# Patient Record
Sex: FEMALE | Race: WHITE | NOT HISPANIC OR LATINO | Employment: UNEMPLOYED | URBAN - NONMETROPOLITAN AREA
[De-identification: names, ages, dates, MRNs, and addresses within clinical notes are randomized per-mention and may not be internally consistent; named-entity substitution may affect disease eponyms.]

---

## 2021-11-08 ENCOUNTER — HOSPITAL ENCOUNTER (OUTPATIENT)
Facility: HOSPITAL | Age: 26
Discharge: HOME | End: 2021-11-12
Attending: EMERGENCY MEDICINE | Admitting: SURGERY
Payer: MEDICAID

## 2021-11-08 DIAGNOSIS — N17.9 ACUTE RENAL FAILURE, UNSPECIFIED ACUTE RENAL FAILURE TYPE: ICD-10-CM

## 2021-11-08 DIAGNOSIS — K61.1 PERIRECTAL ABSCESS: Primary | ICD-10-CM

## 2021-11-08 LAB
ALBUMIN SERPL BCP-MCNC: 3.6 G/DL (ref 3.5–5.2)
ALP SERPL-CCNC: 65 U/L (ref 55–135)
ALT SERPL W/O P-5'-P-CCNC: 22 U/L (ref 10–44)
ANION GAP SERPL CALC-SCNC: 7 MMOL/L (ref 8–16)
AST SERPL-CCNC: 11 U/L (ref 10–40)
BASOPHILS # BLD AUTO: 0.03 K/UL (ref 0–0.2)
BASOPHILS NFR BLD: 0.3 % (ref 0–1.9)
BILIRUB SERPL-MCNC: 0.4 MG/DL (ref 0.1–1)
BUN SERPL-MCNC: 8 MG/DL (ref 6–20)
CALCIUM SERPL-MCNC: 9.1 MG/DL (ref 8.7–10.5)
CHLORIDE SERPL-SCNC: 104 MMOL/L (ref 95–110)
CO2 SERPL-SCNC: 27 MMOL/L (ref 23–29)
CREAT SERPL-MCNC: 0.6 MG/DL (ref 0.5–1.4)
CTP QC/QA: YES
DIFFERENTIAL METHOD: ABNORMAL
EOSINOPHIL # BLD AUTO: 0 K/UL (ref 0–0.5)
EOSINOPHIL NFR BLD: 0.1 % (ref 0–8)
ERYTHROCYTE [DISTWIDTH] IN BLOOD BY AUTOMATED COUNT: 13.7 % (ref 11.5–14.5)
EST. GFR  (AFRICAN AMERICAN): >60 ML/MIN/1.73 M^2
EST. GFR  (NON AFRICAN AMERICAN): >60 ML/MIN/1.73 M^2
GLUCOSE SERPL-MCNC: 93 MG/DL (ref 70–110)
HCT VFR BLD AUTO: 39.7 % (ref 37–48.5)
HGB BLD-MCNC: 12.8 G/DL (ref 12–16)
IMM GRANULOCYTES # BLD AUTO: 0.05 K/UL (ref 0–0.04)
IMM GRANULOCYTES NFR BLD AUTO: 0.4 % (ref 0–0.5)
LYMPHOCYTES # BLD AUTO: 1.9 K/UL (ref 1–4.8)
LYMPHOCYTES NFR BLD: 15.9 % (ref 18–48)
MCH RBC QN AUTO: 27.2 PG (ref 27–31)
MCHC RBC AUTO-ENTMCNC: 32.2 G/DL (ref 32–36)
MCV RBC AUTO: 85 FL (ref 82–98)
MONOCYTES # BLD AUTO: 0.8 K/UL (ref 0.3–1)
MONOCYTES NFR BLD: 6.3 % (ref 4–15)
NEUTROPHILS # BLD AUTO: 9.2 K/UL (ref 1.8–7.7)
NEUTROPHILS NFR BLD: 77 % (ref 38–73)
NRBC BLD-RTO: 0 /100 WBC
PLATELET # BLD AUTO: 354 K/UL (ref 150–450)
PMV BLD AUTO: 9.7 FL (ref 9.2–12.9)
POTASSIUM SERPL-SCNC: 3.7 MMOL/L (ref 3.5–5.1)
PROT SERPL-MCNC: 8 G/DL (ref 6–8.4)
RBC # BLD AUTO: 4.7 M/UL (ref 4–5.4)
SARS-COV-2 RDRP RESP QL NAA+PROBE: NEGATIVE
SODIUM SERPL-SCNC: 138 MMOL/L (ref 136–145)
WBC # BLD AUTO: 11.94 K/UL (ref 3.9–12.7)

## 2021-11-08 PROCEDURE — 63600175 PHARM REV CODE 636 W HCPCS: Performed by: EMERGENCY MEDICINE

## 2021-11-08 PROCEDURE — G0378 HOSPITAL OBSERVATION PER HR: HCPCS | Mod: OBSCO

## 2021-11-08 PROCEDURE — 25000003 PHARM REV CODE 250: Performed by: EMERGENCY MEDICINE

## 2021-11-08 PROCEDURE — 80053 COMPREHEN METABOLIC PANEL: CPT | Performed by: EMERGENCY MEDICINE

## 2021-11-08 PROCEDURE — 85025 COMPLETE CBC W/AUTO DIFF WBC: CPT | Performed by: EMERGENCY MEDICINE

## 2021-11-08 PROCEDURE — 96374 THER/PROPH/DIAG INJ IV PUSH: CPT | Mod: 59

## 2021-11-08 PROCEDURE — 25000003 PHARM REV CODE 250: Performed by: SURGERY

## 2021-11-08 PROCEDURE — 96376 TX/PRO/DX INJ SAME DRUG ADON: CPT | Mod: 59

## 2021-11-08 PROCEDURE — 36415 COLL VENOUS BLD VENIPUNCTURE: CPT | Performed by: EMERGENCY MEDICINE

## 2021-11-08 PROCEDURE — 96375 TX/PRO/DX INJ NEW DRUG ADDON: CPT

## 2021-11-08 PROCEDURE — 96361 HYDRATE IV INFUSION ADD-ON: CPT

## 2021-11-08 PROCEDURE — 99900035 HC TECH TIME PER 15 MIN (STAT)

## 2021-11-08 PROCEDURE — 27000221 HC OXYGEN, UP TO 24 HOURS

## 2021-11-08 PROCEDURE — G0378 HOSPITAL OBSERVATION PER HR: HCPCS

## 2021-11-08 PROCEDURE — 99285 EMERGENCY DEPT VISIT HI MDM: CPT | Mod: 25

## 2021-11-08 PROCEDURE — 25500020 PHARM REV CODE 255: Performed by: EMERGENCY MEDICINE

## 2021-11-08 PROCEDURE — 63600175 PHARM REV CODE 636 W HCPCS: Performed by: SURGERY

## 2021-11-08 PROCEDURE — U0002 COVID-19 LAB TEST NON-CDC: HCPCS | Performed by: EMERGENCY MEDICINE

## 2021-11-08 PROCEDURE — 99900031 HC PATIENT EDUCATION (STAT)

## 2021-11-08 RX ORDER — SODIUM CHLORIDE 0.9 % (FLUSH) 0.9 %
10 SYRINGE (ML) INJECTION
Status: DISCONTINUED | OUTPATIENT
Start: 2021-11-08 | End: 2021-11-12 | Stop reason: HOSPADM

## 2021-11-08 RX ORDER — SODIUM CHLORIDE 9 MG/ML
INJECTION, SOLUTION INTRAVENOUS CONTINUOUS
Status: DISCONTINUED | OUTPATIENT
Start: 2021-11-08 | End: 2021-11-12 | Stop reason: HOSPADM

## 2021-11-08 RX ORDER — HYDROMORPHONE HYDROCHLORIDE 1 MG/ML
0.5 INJECTION, SOLUTION INTRAMUSCULAR; INTRAVENOUS; SUBCUTANEOUS EVERY 4 HOURS PRN
Status: DISCONTINUED | OUTPATIENT
Start: 2021-11-08 | End: 2021-11-09

## 2021-11-08 RX ORDER — MORPHINE SULFATE 4 MG/ML
4 INJECTION, SOLUTION INTRAMUSCULAR; INTRAVENOUS
Status: COMPLETED | OUTPATIENT
Start: 2021-11-08 | End: 2021-11-08

## 2021-11-08 RX ORDER — TALC
6 POWDER (GRAM) TOPICAL NIGHTLY PRN
Status: DISCONTINUED | OUTPATIENT
Start: 2021-11-08 | End: 2021-11-12 | Stop reason: HOSPADM

## 2021-11-08 RX ADMIN — VANCOMYCIN HYDROCHLORIDE 2500 MG: 1 INJECTION, POWDER, LYOPHILIZED, FOR SOLUTION INTRAVENOUS at 08:11

## 2021-11-08 RX ADMIN — SODIUM CHLORIDE: 0.9 INJECTION, SOLUTION INTRAVENOUS at 05:11

## 2021-11-08 RX ADMIN — IOHEXOL 100 ML: 350 INJECTION, SOLUTION INTRAVENOUS at 03:11

## 2021-11-08 RX ADMIN — HYDROMORPHONE HYDROCHLORIDE 0.5 MG: 1 INJECTION, SOLUTION INTRAMUSCULAR; INTRAVENOUS; SUBCUTANEOUS at 06:11

## 2021-11-08 RX ADMIN — PIPERACILLIN AND TAZOBACTAM 4.5 G: 4; .5 INJECTION, POWDER, LYOPHILIZED, FOR SOLUTION INTRAVENOUS; PARENTERAL at 05:11

## 2021-11-08 RX ADMIN — MORPHINE SULFATE 4 MG: 4 INJECTION INTRAVENOUS at 03:11

## 2021-11-09 ENCOUNTER — ANESTHESIA EVENT (OUTPATIENT)
Dept: SURGERY | Facility: HOSPITAL | Age: 26
End: 2021-11-09
Payer: MEDICAID

## 2021-11-09 ENCOUNTER — ANESTHESIA (OUTPATIENT)
Dept: SURGERY | Facility: HOSPITAL | Age: 26
End: 2021-11-09
Payer: MEDICAID

## 2021-11-09 PROBLEM — K61.1 PERIRECTAL ABSCESS: Status: ACTIVE | Noted: 2021-11-09

## 2021-11-09 PROCEDURE — 87076 CULTURE ANAEROBE IDENT EACH: CPT | Performed by: SURGERY

## 2021-11-09 PROCEDURE — 99900035 HC TECH TIME PER 15 MIN (STAT)

## 2021-11-09 PROCEDURE — 94799 UNLISTED PULMONARY SVC/PX: CPT

## 2021-11-09 PROCEDURE — 37000008 HC ANESTHESIA 1ST 15 MINUTES: Performed by: SURGERY

## 2021-11-09 PROCEDURE — 00902 ANES ANORECTAL PX: CPT | Performed by: SURGERY

## 2021-11-09 PROCEDURE — 87070 CULTURE OTHR SPECIMN AEROBIC: CPT | Performed by: SURGERY

## 2021-11-09 PROCEDURE — 96366 THER/PROPH/DIAG IV INF ADDON: CPT

## 2021-11-09 PROCEDURE — 25000003 PHARM REV CODE 250: Performed by: SURGERY

## 2021-11-09 PROCEDURE — 37000009 HC ANESTHESIA EA ADD 15 MINS: Performed by: SURGERY

## 2021-11-09 PROCEDURE — 96365 THER/PROPH/DIAG IV INF INIT: CPT

## 2021-11-09 PROCEDURE — 63600175 PHARM REV CODE 636 W HCPCS: Performed by: SURGERY

## 2021-11-09 PROCEDURE — 96367 TX/PROPH/DG ADDL SEQ IV INF: CPT

## 2021-11-09 PROCEDURE — G0378 HOSPITAL OBSERVATION PER HR: HCPCS

## 2021-11-09 PROCEDURE — 96361 HYDRATE IV INFUSION ADD-ON: CPT | Mod: 59

## 2021-11-09 PROCEDURE — 36000705 HC OR TIME LEV I EA ADD 15 MIN: Performed by: SURGERY

## 2021-11-09 PROCEDURE — 99900031 HC PATIENT EDUCATION (STAT)

## 2021-11-09 PROCEDURE — 71000033 HC RECOVERY, INTIAL HOUR: Performed by: SURGERY

## 2021-11-09 PROCEDURE — 36000704 HC OR TIME LEV I 1ST 15 MIN: Performed by: SURGERY

## 2021-11-09 PROCEDURE — 96376 TX/PRO/DX INJ SAME DRUG ADON: CPT | Mod: 59

## 2021-11-09 PROCEDURE — 94761 N-INVAS EAR/PLS OXIMETRY MLT: CPT

## 2021-11-09 PROCEDURE — 87075 CULTR BACTERIA EXCEPT BLOOD: CPT | Performed by: SURGERY

## 2021-11-09 PROCEDURE — 63600175 PHARM REV CODE 636 W HCPCS: Performed by: REGISTERED NURSE

## 2021-11-09 RX ORDER — DEXAMETHASONE SODIUM PHOSPHATE 4 MG/ML
INJECTION, SOLUTION INTRA-ARTICULAR; INTRALESIONAL; INTRAMUSCULAR; INTRAVENOUS; SOFT TISSUE
Status: DISCONTINUED | OUTPATIENT
Start: 2021-11-09 | End: 2021-11-09

## 2021-11-09 RX ORDER — ACETAMINOPHEN 325 MG/1
650 TABLET ORAL EVERY 6 HOURS PRN
Status: DISCONTINUED | OUTPATIENT
Start: 2021-11-09 | End: 2021-11-12 | Stop reason: HOSPADM

## 2021-11-09 RX ORDER — HYDROMORPHONE HYDROCHLORIDE 1 MG/ML
1 INJECTION, SOLUTION INTRAMUSCULAR; INTRAVENOUS; SUBCUTANEOUS EVERY 4 HOURS PRN
Status: DISCONTINUED | OUTPATIENT
Start: 2021-11-09 | End: 2021-11-12 | Stop reason: HOSPADM

## 2021-11-09 RX ORDER — DIPHENHYDRAMINE HYDROCHLORIDE 50 MG/ML
25 INJECTION INTRAMUSCULAR; INTRAVENOUS EVERY 6 HOURS PRN
Status: DISCONTINUED | OUTPATIENT
Start: 2021-11-09 | End: 2021-11-09

## 2021-11-09 RX ORDER — ONDANSETRON 2 MG/ML
INJECTION INTRAMUSCULAR; INTRAVENOUS
Status: DISCONTINUED | OUTPATIENT
Start: 2021-11-09 | End: 2021-11-09

## 2021-11-09 RX ORDER — PROPOFOL 10 MG/ML
INJECTION, EMULSION INTRAVENOUS
Status: DISCONTINUED | OUTPATIENT
Start: 2021-11-09 | End: 2021-11-09

## 2021-11-09 RX ORDER — LIDOCAINE 50 MG/G
OINTMENT TOPICAL
Status: DISCONTINUED | OUTPATIENT
Start: 2021-11-09 | End: 2021-11-09 | Stop reason: HOSPADM

## 2021-11-09 RX ORDER — BUPIVACAINE HYDROCHLORIDE AND EPINEPHRINE 5; 5 MG/ML; UG/ML
INJECTION, SOLUTION EPIDURAL; INTRACAUDAL; PERINEURAL
Status: DISCONTINUED | OUTPATIENT
Start: 2021-11-09 | End: 2021-11-09 | Stop reason: HOSPADM

## 2021-11-09 RX ORDER — HYDROMORPHONE HYDROCHLORIDE 1 MG/ML
0.5 INJECTION, SOLUTION INTRAMUSCULAR; INTRAVENOUS; SUBCUTANEOUS EVERY 5 MIN PRN
Status: DISCONTINUED | OUTPATIENT
Start: 2021-11-09 | End: 2021-11-09

## 2021-11-09 RX ORDER — TRAMADOL HYDROCHLORIDE 50 MG/1
50 TABLET ORAL EVERY 4 HOURS PRN
Status: DISCONTINUED | OUTPATIENT
Start: 2021-11-09 | End: 2021-11-12 | Stop reason: HOSPADM

## 2021-11-09 RX ORDER — ONDANSETRON 2 MG/ML
4 INJECTION INTRAMUSCULAR; INTRAVENOUS EVERY 6 HOURS PRN
Status: DISCONTINUED | OUTPATIENT
Start: 2021-11-09 | End: 2021-11-12 | Stop reason: HOSPADM

## 2021-11-09 RX ORDER — HYDROCODONE BITARTRATE AND ACETAMINOPHEN 5; 325 MG/1; MG/1
1 TABLET ORAL EVERY 4 HOURS PRN
Status: DISCONTINUED | OUTPATIENT
Start: 2021-11-09 | End: 2021-11-12 | Stop reason: HOSPADM

## 2021-11-09 RX ORDER — SODIUM CHLORIDE 9 MG/ML
INJECTION, SOLUTION INTRAVENOUS CONTINUOUS
Status: DISCONTINUED | OUTPATIENT
Start: 2021-11-09 | End: 2021-11-09

## 2021-11-09 RX ORDER — PROCHLORPERAZINE EDISYLATE 5 MG/ML
5 INJECTION INTRAMUSCULAR; INTRAVENOUS EVERY 6 HOURS PRN
Status: DISCONTINUED | OUTPATIENT
Start: 2021-11-09 | End: 2021-11-12 | Stop reason: HOSPADM

## 2021-11-09 RX ORDER — ONDANSETRON 2 MG/ML
4 INJECTION INTRAMUSCULAR; INTRAVENOUS DAILY PRN
Status: DISCONTINUED | OUTPATIENT
Start: 2021-11-09 | End: 2021-11-09

## 2021-11-09 RX ORDER — FENTANYL CITRATE 50 UG/ML
INJECTION, SOLUTION INTRAMUSCULAR; INTRAVENOUS
Status: DISCONTINUED | OUTPATIENT
Start: 2021-11-09 | End: 2021-11-09

## 2021-11-09 RX ORDER — MORPHINE SULFATE 4 MG/ML
4 INJECTION, SOLUTION INTRAMUSCULAR; INTRAVENOUS EVERY 5 MIN PRN
Status: DISCONTINUED | OUTPATIENT
Start: 2021-11-09 | End: 2021-11-09

## 2021-11-09 RX ORDER — KETOROLAC TROMETHAMINE 30 MG/ML
INJECTION, SOLUTION INTRAMUSCULAR; INTRAVENOUS
Status: DISCONTINUED | OUTPATIENT
Start: 2021-11-09 | End: 2021-11-09

## 2021-11-09 RX ADMIN — ONDANSETRON 4 MG: 2 INJECTION INTRAMUSCULAR; INTRAVENOUS at 08:11

## 2021-11-09 RX ADMIN — PIPERACILLIN AND TAZOBACTAM 4.5 G: 4; .5 INJECTION, POWDER, LYOPHILIZED, FOR SOLUTION INTRAVENOUS; PARENTERAL at 05:11

## 2021-11-09 RX ADMIN — VANCOMYCIN HYDROCHLORIDE 2000 MG: 1 INJECTION, POWDER, LYOPHILIZED, FOR SOLUTION INTRAVENOUS at 08:11

## 2021-11-09 RX ADMIN — PIPERACILLIN AND TAZOBACTAM 4.5 G: 4; .5 INJECTION, POWDER, LYOPHILIZED, FOR SOLUTION INTRAVENOUS; PARENTERAL at 10:11

## 2021-11-09 RX ADMIN — ACETAMINOPHEN 650 MG: 325 TABLET ORAL at 04:11

## 2021-11-09 RX ADMIN — PIPERACILLIN AND TAZOBACTAM 4.5 G: 4; .5 INJECTION, POWDER, LYOPHILIZED, FOR SOLUTION INTRAVENOUS; PARENTERAL at 02:11

## 2021-11-09 RX ADMIN — SODIUM CHLORIDE: 0.9 INJECTION, SOLUTION INTRAVENOUS at 03:11

## 2021-11-09 RX ADMIN — KETOROLAC TROMETHAMINE 30 MG: 30 INJECTION, SOLUTION INTRAMUSCULAR at 08:11

## 2021-11-09 RX ADMIN — PROPOFOL 200 MG: 10 INJECTION, EMULSION INTRAVENOUS at 07:11

## 2021-11-09 RX ADMIN — FENTANYL CITRATE 50 MCG: 50 INJECTION, SOLUTION INTRAMUSCULAR; INTRAVENOUS at 07:11

## 2021-11-09 RX ADMIN — DEXAMETHASONE SODIUM PHOSPHATE 4 MG: 4 INJECTION, SOLUTION INTRA-ARTICULAR; INTRALESIONAL; INTRAMUSCULAR; INTRAVENOUS; SOFT TISSUE at 08:11

## 2021-11-09 RX ADMIN — FENTANYL CITRATE 50 MCG: 50 INJECTION, SOLUTION INTRAMUSCULAR; INTRAVENOUS at 08:11

## 2021-11-09 RX ADMIN — TRAMADOL HYDROCHLORIDE 50 MG: 50 TABLET, FILM COATED ORAL at 09:11

## 2021-11-10 LAB
ANION GAP SERPL CALC-SCNC: 7 MMOL/L (ref 8–16)
BASOPHILS # BLD AUTO: 0.03 K/UL (ref 0–0.2)
BASOPHILS NFR BLD: 0.2 % (ref 0–1.9)
BUN SERPL-MCNC: 19 MG/DL (ref 6–20)
CALCIUM SERPL-MCNC: 8.5 MG/DL (ref 8.7–10.5)
CHLORIDE SERPL-SCNC: 112 MMOL/L (ref 95–110)
CO2 SERPL-SCNC: 23 MMOL/L (ref 23–29)
CREAT SERPL-MCNC: 1.9 MG/DL (ref 0.5–1.4)
DIFFERENTIAL METHOD: ABNORMAL
EOSINOPHIL # BLD AUTO: 0 K/UL (ref 0–0.5)
EOSINOPHIL NFR BLD: 0 % (ref 0–8)
ERYTHROCYTE [DISTWIDTH] IN BLOOD BY AUTOMATED COUNT: 13.8 % (ref 11.5–14.5)
EST. GFR  (AFRICAN AMERICAN): 40.8 ML/MIN/1.73 M^2
EST. GFR  (NON AFRICAN AMERICAN): 35.4 ML/MIN/1.73 M^2
GLUCOSE SERPL-MCNC: 105 MG/DL (ref 70–110)
HCT VFR BLD AUTO: 36.1 % (ref 37–48.5)
HGB BLD-MCNC: 11.5 G/DL (ref 12–16)
IMM GRANULOCYTES # BLD AUTO: 0.06 K/UL (ref 0–0.04)
IMM GRANULOCYTES NFR BLD AUTO: 0.4 % (ref 0–0.5)
LYMPHOCYTES # BLD AUTO: 1.8 K/UL (ref 1–4.8)
LYMPHOCYTES NFR BLD: 12.8 % (ref 18–48)
MCH RBC QN AUTO: 26.9 PG (ref 27–31)
MCHC RBC AUTO-ENTMCNC: 31.9 G/DL (ref 32–36)
MCV RBC AUTO: 85 FL (ref 82–98)
MONOCYTES # BLD AUTO: 1 K/UL (ref 0.3–1)
MONOCYTES NFR BLD: 7.1 % (ref 4–15)
NEUTROPHILS # BLD AUTO: 10.9 K/UL (ref 1.8–7.7)
NEUTROPHILS NFR BLD: 79.5 % (ref 38–73)
NRBC BLD-RTO: 0 /100 WBC
PLATELET # BLD AUTO: 334 K/UL (ref 150–450)
PMV BLD AUTO: 9.8 FL (ref 9.2–12.9)
POTASSIUM SERPL-SCNC: 3.6 MMOL/L (ref 3.5–5.1)
RBC # BLD AUTO: 4.27 M/UL (ref 4–5.4)
SODIUM SERPL-SCNC: 142 MMOL/L (ref 136–145)
VANCOMYCIN TROUGH SERPL-MCNC: 36.5 UG/ML (ref 10–22)
WBC # BLD AUTO: 13.71 K/UL (ref 3.9–12.7)

## 2021-11-10 PROCEDURE — 85025 COMPLETE CBC W/AUTO DIFF WBC: CPT | Performed by: SURGERY

## 2021-11-10 PROCEDURE — 80048 BASIC METABOLIC PNL TOTAL CA: CPT | Performed by: SURGERY

## 2021-11-10 PROCEDURE — 96374 THER/PROPH/DIAG INJ IV PUSH: CPT | Mod: 59

## 2021-11-10 PROCEDURE — G0378 HOSPITAL OBSERVATION PER HR: HCPCS

## 2021-11-10 PROCEDURE — 25000003 PHARM REV CODE 250: Performed by: SURGERY

## 2021-11-10 PROCEDURE — 36415 COLL VENOUS BLD VENIPUNCTURE: CPT | Performed by: SURGERY

## 2021-11-10 PROCEDURE — 94799 UNLISTED PULMONARY SVC/PX: CPT

## 2021-11-10 PROCEDURE — 99900035 HC TECH TIME PER 15 MIN (STAT)

## 2021-11-10 PROCEDURE — 80202 ASSAY OF VANCOMYCIN: CPT | Performed by: SURGERY

## 2021-11-10 PROCEDURE — 96376 TX/PRO/DX INJ SAME DRUG ADON: CPT

## 2021-11-10 PROCEDURE — 96361 HYDRATE IV INFUSION ADD-ON: CPT

## 2021-11-10 PROCEDURE — 99900031 HC PATIENT EDUCATION (STAT)

## 2021-11-10 PROCEDURE — 63600175 PHARM REV CODE 636 W HCPCS: Performed by: SURGERY

## 2021-11-10 RX ADMIN — VANCOMYCIN HYDROCHLORIDE 2000 MG: 1 INJECTION, POWDER, LYOPHILIZED, FOR SOLUTION INTRAVENOUS at 08:11

## 2021-11-10 RX ADMIN — PIPERACILLIN AND TAZOBACTAM 4.5 G: 4; .5 INJECTION, POWDER, LYOPHILIZED, FOR SOLUTION INTRAVENOUS; PARENTERAL at 05:11

## 2021-11-10 RX ADMIN — PIPERACILLIN AND TAZOBACTAM 4.5 G: 4; .5 INJECTION, POWDER, LYOPHILIZED, FOR SOLUTION INTRAVENOUS; PARENTERAL at 09:11

## 2021-11-10 RX ADMIN — PIPERACILLIN AND TAZOBACTAM 4.5 G: 4; .5 INJECTION, POWDER, LYOPHILIZED, FOR SOLUTION INTRAVENOUS; PARENTERAL at 02:11

## 2021-11-11 LAB
ANION GAP SERPL CALC-SCNC: 7 MMOL/L (ref 8–16)
BASOPHILS # BLD AUTO: 0.04 K/UL (ref 0–0.2)
BASOPHILS NFR BLD: 0.4 % (ref 0–1.9)
BUN SERPL-MCNC: 17 MG/DL (ref 6–20)
CALCIUM SERPL-MCNC: 8.9 MG/DL (ref 8.7–10.5)
CHLORIDE SERPL-SCNC: 112 MMOL/L (ref 95–110)
CO2 SERPL-SCNC: 24 MMOL/L (ref 23–29)
CREAT SERPL-MCNC: 1.9 MG/DL (ref 0.5–1.4)
DIFFERENTIAL METHOD: ABNORMAL
EOSINOPHIL # BLD AUTO: 0 K/UL (ref 0–0.5)
EOSINOPHIL NFR BLD: 0.1 % (ref 0–8)
ERYTHROCYTE [DISTWIDTH] IN BLOOD BY AUTOMATED COUNT: 14 % (ref 11.5–14.5)
EST. GFR  (AFRICAN AMERICAN): 40.8 ML/MIN/1.73 M^2
EST. GFR  (NON AFRICAN AMERICAN): 35.4 ML/MIN/1.73 M^2
GLUCOSE SERPL-MCNC: 106 MG/DL (ref 70–110)
HCT VFR BLD AUTO: 34.9 % (ref 37–48.5)
HGB BLD-MCNC: 11.1 G/DL (ref 12–16)
IMM GRANULOCYTES # BLD AUTO: 0.04 K/UL (ref 0–0.04)
IMM GRANULOCYTES NFR BLD AUTO: 0.4 % (ref 0–0.5)
LYMPHOCYTES # BLD AUTO: 1.3 K/UL (ref 1–4.8)
LYMPHOCYTES NFR BLD: 11.8 % (ref 18–48)
MCH RBC QN AUTO: 27 PG (ref 27–31)
MCHC RBC AUTO-ENTMCNC: 31.8 G/DL (ref 32–36)
MCV RBC AUTO: 85 FL (ref 82–98)
MONOCYTES # BLD AUTO: 1 K/UL (ref 0.3–1)
MONOCYTES NFR BLD: 8.8 % (ref 4–15)
NEUTROPHILS # BLD AUTO: 8.5 K/UL (ref 1.8–7.7)
NEUTROPHILS NFR BLD: 78.5 % (ref 38–73)
NRBC BLD-RTO: 0 /100 WBC
PLATELET # BLD AUTO: 330 K/UL (ref 150–450)
PMV BLD AUTO: 9.7 FL (ref 9.2–12.9)
POTASSIUM SERPL-SCNC: 3.7 MMOL/L (ref 3.5–5.1)
RBC # BLD AUTO: 4.11 M/UL (ref 4–5.4)
SODIUM SERPL-SCNC: 143 MMOL/L (ref 136–145)
VANCOMYCIN SERPL-MCNC: 17.5 UG/ML
VANCOMYCIN SERPL-MCNC: 20.3 UG/ML
WBC # BLD AUTO: 10.78 K/UL (ref 3.9–12.7)

## 2021-11-11 PROCEDURE — G0378 HOSPITAL OBSERVATION PER HR: HCPCS

## 2021-11-11 PROCEDURE — 63600175 PHARM REV CODE 636 W HCPCS: Performed by: SURGERY

## 2021-11-11 PROCEDURE — 99900035 HC TECH TIME PER 15 MIN (STAT)

## 2021-11-11 PROCEDURE — 85025 COMPLETE CBC W/AUTO DIFF WBC: CPT | Performed by: SURGERY

## 2021-11-11 PROCEDURE — 80202 ASSAY OF VANCOMYCIN: CPT | Performed by: SURGERY

## 2021-11-11 PROCEDURE — 96361 HYDRATE IV INFUSION ADD-ON: CPT

## 2021-11-11 PROCEDURE — 36415 COLL VENOUS BLD VENIPUNCTURE: CPT | Performed by: SURGERY

## 2021-11-11 PROCEDURE — 96376 TX/PRO/DX INJ SAME DRUG ADON: CPT

## 2021-11-11 PROCEDURE — 25000003 PHARM REV CODE 250: Performed by: SURGERY

## 2021-11-11 PROCEDURE — 80048 BASIC METABOLIC PNL TOTAL CA: CPT | Performed by: SURGERY

## 2021-11-11 RX ADMIN — PIPERACILLIN AND TAZOBACTAM 4.5 G: 4; .5 INJECTION, POWDER, LYOPHILIZED, FOR SOLUTION INTRAVENOUS; PARENTERAL at 01:11

## 2021-11-11 RX ADMIN — PIPERACILLIN AND TAZOBACTAM 4.5 G: 4; .5 INJECTION, POWDER, LYOPHILIZED, FOR SOLUTION INTRAVENOUS; PARENTERAL at 09:11

## 2021-11-11 RX ADMIN — PIPERACILLIN AND TAZOBACTAM 4.5 G: 4; .5 INJECTION, POWDER, LYOPHILIZED, FOR SOLUTION INTRAVENOUS; PARENTERAL at 05:11

## 2021-11-11 RX ADMIN — VANCOMYCIN HYDROCHLORIDE 2000 MG: 10 INJECTION, POWDER, LYOPHILIZED, FOR SOLUTION INTRAVENOUS at 04:11

## 2021-11-12 VITALS
BODY MASS INDEX: 43.77 KG/M2 | TEMPERATURE: 99 F | DIASTOLIC BLOOD PRESSURE: 81 MMHG | OXYGEN SATURATION: 95 % | RESPIRATION RATE: 18 BRPM | SYSTOLIC BLOOD PRESSURE: 147 MMHG | HEIGHT: 63 IN | WEIGHT: 247 LBS | HEART RATE: 95 BPM

## 2021-11-12 PROBLEM — N17.9 ACUTE RENAL FAILURE: Status: ACTIVE | Noted: 2021-11-12

## 2021-11-12 LAB
ANION GAP SERPL CALC-SCNC: 13 MMOL/L (ref 8–16)
BASOPHILS # BLD AUTO: 0.03 K/UL (ref 0–0.2)
BASOPHILS NFR BLD: 0.3 % (ref 0–1.9)
BUN SERPL-MCNC: 13 MG/DL (ref 6–20)
CALCIUM SERPL-MCNC: 9 MG/DL (ref 8.7–10.5)
CHLORIDE SERPL-SCNC: 107 MMOL/L (ref 95–110)
CO2 SERPL-SCNC: 24 MMOL/L (ref 23–29)
CREAT SERPL-MCNC: 1.9 MG/DL (ref 0.5–1.4)
DIFFERENTIAL METHOD: ABNORMAL
EOSINOPHIL # BLD AUTO: 0.1 K/UL (ref 0–0.5)
EOSINOPHIL NFR BLD: 0.6 % (ref 0–8)
ERYTHROCYTE [DISTWIDTH] IN BLOOD BY AUTOMATED COUNT: 13.8 % (ref 11.5–14.5)
EST. GFR  (AFRICAN AMERICAN): 40.8 ML/MIN/1.73 M^2
EST. GFR  (NON AFRICAN AMERICAN): 35.4 ML/MIN/1.73 M^2
GLUCOSE SERPL-MCNC: 124 MG/DL (ref 70–110)
HCT VFR BLD AUTO: 33.4 % (ref 37–48.5)
HGB BLD-MCNC: 10.8 G/DL (ref 12–16)
IMM GRANULOCYTES # BLD AUTO: 0.05 K/UL (ref 0–0.04)
IMM GRANULOCYTES NFR BLD AUTO: 0.5 % (ref 0–0.5)
LYMPHOCYTES # BLD AUTO: 1.2 K/UL (ref 1–4.8)
LYMPHOCYTES NFR BLD: 12.3 % (ref 18–48)
MCH RBC QN AUTO: 27 PG (ref 27–31)
MCHC RBC AUTO-ENTMCNC: 32.3 G/DL (ref 32–36)
MCV RBC AUTO: 84 FL (ref 82–98)
MONOCYTES # BLD AUTO: 0.7 K/UL (ref 0.3–1)
MONOCYTES NFR BLD: 7.1 % (ref 4–15)
NEUTROPHILS # BLD AUTO: 7.4 K/UL (ref 1.8–7.7)
NEUTROPHILS NFR BLD: 79.2 % (ref 38–73)
NRBC BLD-RTO: 0 /100 WBC
PLATELET # BLD AUTO: 332 K/UL (ref 150–450)
PMV BLD AUTO: 9.7 FL (ref 9.2–12.9)
POTASSIUM SERPL-SCNC: 3.7 MMOL/L (ref 3.5–5.1)
RBC # BLD AUTO: 4 M/UL (ref 4–5.4)
SODIUM SERPL-SCNC: 144 MMOL/L (ref 136–145)
WBC # BLD AUTO: 9.36 K/UL (ref 3.9–12.7)

## 2021-11-12 PROCEDURE — 94761 N-INVAS EAR/PLS OXIMETRY MLT: CPT

## 2021-11-12 PROCEDURE — 96376 TX/PRO/DX INJ SAME DRUG ADON: CPT

## 2021-11-12 PROCEDURE — 80048 BASIC METABOLIC PNL TOTAL CA: CPT | Performed by: SURGERY

## 2021-11-12 PROCEDURE — 36415 COLL VENOUS BLD VENIPUNCTURE: CPT | Performed by: SURGERY

## 2021-11-12 PROCEDURE — 99900035 HC TECH TIME PER 15 MIN (STAT)

## 2021-11-12 PROCEDURE — 96366 THER/PROPH/DIAG IV INF ADDON: CPT

## 2021-11-12 PROCEDURE — 25000003 PHARM REV CODE 250: Performed by: SURGERY

## 2021-11-12 PROCEDURE — 85025 COMPLETE CBC W/AUTO DIFF WBC: CPT | Performed by: SURGERY

## 2021-11-12 PROCEDURE — G0378 HOSPITAL OBSERVATION PER HR: HCPCS

## 2021-11-12 PROCEDURE — 96361 HYDRATE IV INFUSION ADD-ON: CPT

## 2021-11-12 PROCEDURE — 63600175 PHARM REV CODE 636 W HCPCS: Performed by: SURGERY

## 2021-11-12 PROCEDURE — 99900031 HC PATIENT EDUCATION (STAT)

## 2021-11-12 RX ORDER — HYDROCODONE BITARTRATE AND ACETAMINOPHEN 5; 325 MG/1; MG/1
1 TABLET ORAL EVERY 4 HOURS PRN
Refills: 0 | Status: ON HOLD
Start: 2021-11-12 | End: 2022-10-09

## 2021-11-12 RX ORDER — METRONIDAZOLE 500 MG/1
500 TABLET ORAL EVERY 8 HOURS
Qty: 30 TABLET | Refills: 0 | Status: ON HOLD | OUTPATIENT
Start: 2021-11-12 | End: 2022-10-09

## 2021-11-12 RX ORDER — MOXIFLOXACIN HYDROCHLORIDE 400 MG/1
400 TABLET ORAL DAILY
Qty: 7 TABLET | Refills: 0 | Status: ON HOLD | OUTPATIENT
Start: 2021-11-12 | End: 2022-10-09

## 2021-11-12 RX ADMIN — HYDROMORPHONE HYDROCHLORIDE 1 MG: 1 INJECTION, SOLUTION INTRAMUSCULAR; INTRAVENOUS; SUBCUTANEOUS at 04:11

## 2021-11-12 RX ADMIN — PIPERACILLIN AND TAZOBACTAM 4.5 G: 4; .5 INJECTION, POWDER, LYOPHILIZED, FOR SOLUTION INTRAVENOUS; PARENTERAL at 02:11

## 2021-11-12 RX ADMIN — PIPERACILLIN AND TAZOBACTAM 4.5 G: 4; .5 INJECTION, POWDER, LYOPHILIZED, FOR SOLUTION INTRAVENOUS; PARENTERAL at 09:11

## 2021-11-12 RX ADMIN — HYDROCODONE BITARTRATE AND ACETAMINOPHEN 1 TABLET: 5; 325 TABLET ORAL at 10:11

## 2021-11-13 LAB — BACTERIA SPEC AEROBE CULT: NO GROWTH

## 2021-11-16 LAB — BACTERIA SPEC ANAEROBE CULT: ABNORMAL

## 2021-11-17 DIAGNOSIS — K61.2 ANORECTAL ABSCESS: Primary | ICD-10-CM

## 2021-11-24 ENCOUNTER — HOSPITAL ENCOUNTER (OUTPATIENT)
Dept: RADIOLOGY | Facility: HOSPITAL | Age: 26
Discharge: HOME | End: 2021-11-24
Attending: SURGERY
Payer: MEDICAID

## 2021-11-24 DIAGNOSIS — K61.2 ANORECTAL ABSCESS: ICD-10-CM

## 2021-11-24 PROCEDURE — 25500020 PHARM REV CODE 255: Performed by: SURGERY

## 2021-11-24 PROCEDURE — 72193 CT PELVIS W/DYE: CPT | Mod: TC

## 2021-11-24 RX ADMIN — IOHEXOL 100 ML: 350 INJECTION, SOLUTION INTRAVENOUS at 09:11

## 2022-04-13 DIAGNOSIS — K61.2 ANORECTAL ABSCESS: Primary | ICD-10-CM

## 2022-04-20 ENCOUNTER — HOSPITAL ENCOUNTER (OUTPATIENT)
Dept: RADIOLOGY | Facility: HOSPITAL | Age: 27
Discharge: HOME | End: 2022-04-20
Attending: SURGERY
Payer: MEDICAID

## 2022-04-20 DIAGNOSIS — K61.2 ANORECTAL ABSCESS: ICD-10-CM

## 2022-04-20 PROCEDURE — 25500020 PHARM REV CODE 255: Performed by: SURGERY

## 2022-04-20 PROCEDURE — 72193 CT PELVIS W/DYE: CPT | Mod: TC

## 2022-04-20 RX ADMIN — IOHEXOL 100 ML: 350 INJECTION, SOLUTION INTRAVENOUS at 08:04

## 2022-10-06 ENCOUNTER — HOSPITAL ENCOUNTER (OUTPATIENT)
Facility: HOSPITAL | Age: 27
Discharge: HOME | End: 2022-10-09
Attending: SURGERY | Admitting: SURGERY
Payer: MEDICAID

## 2022-10-06 DIAGNOSIS — K61.1 PERIRECTAL ABSCESS: Primary | ICD-10-CM

## 2022-10-06 DIAGNOSIS — K60.2 ANAL FISSURE, UNSPECIFIED: Primary | ICD-10-CM

## 2022-10-06 DIAGNOSIS — K60.2 ANAL FISSURE, UNSPECIFIED: ICD-10-CM

## 2022-10-06 DIAGNOSIS — K60.3 FISTULA-IN-ANO: ICD-10-CM

## 2022-10-06 DIAGNOSIS — K61.1 PERIRECTAL ABSCESS: ICD-10-CM

## 2022-10-06 LAB
ALBUMIN SERPL BCP-MCNC: 3.6 G/DL (ref 3.5–5.2)
ALP SERPL-CCNC: 63 U/L (ref 55–135)
ALT SERPL W/O P-5'-P-CCNC: 20 U/L (ref 10–44)
ANION GAP SERPL CALC-SCNC: 4 MMOL/L (ref 8–16)
APTT BLDCRRT: 23.1 SEC (ref 21–32)
AST SERPL-CCNC: 10 U/L (ref 10–40)
BASOPHILS # BLD AUTO: 0.04 K/UL (ref 0–0.2)
BASOPHILS NFR BLD: 0.2 % (ref 0–1.9)
BILIRUB SERPL-MCNC: 0.6 MG/DL (ref 0.1–1)
BUN SERPL-MCNC: 10 MG/DL (ref 6–20)
CALCIUM SERPL-MCNC: 9.3 MG/DL (ref 8.7–10.5)
CHLORIDE SERPL-SCNC: 106 MMOL/L (ref 95–110)
CO2 SERPL-SCNC: 26 MMOL/L (ref 23–29)
CREAT SERPL-MCNC: 0.6 MG/DL (ref 0.5–1.4)
CRP SERPL-MCNC: 14.8 MG/DL (ref 0–0.75)
DIFFERENTIAL METHOD: ABNORMAL
EOSINOPHIL # BLD AUTO: 0 K/UL (ref 0–0.5)
EOSINOPHIL NFR BLD: 0.1 % (ref 0–8)
ERYTHROCYTE [DISTWIDTH] IN BLOOD BY AUTOMATED COUNT: 13.2 % (ref 11.5–14.5)
ERYTHROCYTE [SEDIMENTATION RATE] IN BLOOD: 41 MM/HR (ref 0–20)
EST. GFR  (NO RACE VARIABLE): >60 ML/MIN/1.73 M^2
GLUCOSE SERPL-MCNC: 98 MG/DL (ref 70–110)
HCT VFR BLD AUTO: 39 % (ref 37–48.5)
HGB BLD-MCNC: 13 G/DL (ref 12–16)
IMM GRANULOCYTES # BLD AUTO: 0.08 K/UL (ref 0–0.04)
IMM GRANULOCYTES NFR BLD AUTO: 0.5 % (ref 0–0.5)
INR PPP: 1 (ref 0.8–1.2)
LYMPHOCYTES # BLD AUTO: 1.6 K/UL (ref 1–4.8)
LYMPHOCYTES NFR BLD: 9.6 % (ref 18–48)
MCH RBC QN AUTO: 27.8 PG (ref 27–31)
MCHC RBC AUTO-ENTMCNC: 33.3 G/DL (ref 32–36)
MCV RBC AUTO: 84 FL (ref 82–98)
MONOCYTES # BLD AUTO: 1 K/UL (ref 0.3–1)
MONOCYTES NFR BLD: 6.4 % (ref 4–15)
NEUTROPHILS # BLD AUTO: 13.4 K/UL (ref 1.8–7.7)
NEUTROPHILS NFR BLD: 83.2 % (ref 38–73)
NRBC BLD-RTO: 0 /100 WBC
PLATELET # BLD AUTO: 316 K/UL (ref 150–450)
PMV BLD AUTO: 10 FL (ref 9.2–12.9)
POTASSIUM SERPL-SCNC: 3.5 MMOL/L (ref 3.5–5.1)
PROT SERPL-MCNC: 7.8 G/DL (ref 6–8.4)
PROTHROMBIN TIME: 11 SEC (ref 9–12.5)
RBC # BLD AUTO: 4.67 M/UL (ref 4–5.4)
SODIUM SERPL-SCNC: 136 MMOL/L (ref 136–145)
WBC # BLD AUTO: 16.14 K/UL (ref 3.9–12.7)

## 2022-10-06 PROCEDURE — 85025 COMPLETE CBC W/AUTO DIFF WBC: CPT | Performed by: SURGERY

## 2022-10-06 PROCEDURE — 36415 COLL VENOUS BLD VENIPUNCTURE: CPT | Performed by: SURGERY

## 2022-10-06 PROCEDURE — 85730 THROMBOPLASTIN TIME PARTIAL: CPT | Performed by: SURGERY

## 2022-10-06 PROCEDURE — 85651 RBC SED RATE NONAUTOMATED: CPT | Performed by: SURGERY

## 2022-10-06 PROCEDURE — G0378 HOSPITAL OBSERVATION PER HR: HCPCS

## 2022-10-06 PROCEDURE — G0378 HOSPITAL OBSERVATION PER HR: HCPCS | Mod: OBSCO

## 2022-10-06 PROCEDURE — G0379 DIRECT REFER HOSPITAL OBSERV: HCPCS

## 2022-10-06 PROCEDURE — 25000003 PHARM REV CODE 250: Performed by: SURGERY

## 2022-10-06 PROCEDURE — 86140 C-REACTIVE PROTEIN: CPT | Performed by: SURGERY

## 2022-10-06 PROCEDURE — 85610 PROTHROMBIN TIME: CPT | Performed by: SURGERY

## 2022-10-06 PROCEDURE — 25500020 PHARM REV CODE 255: Performed by: SURGERY

## 2022-10-06 PROCEDURE — 63600175 PHARM REV CODE 636 W HCPCS: Performed by: SURGERY

## 2022-10-06 PROCEDURE — 80053 COMPREHEN METABOLIC PANEL: CPT | Performed by: SURGERY

## 2022-10-06 RX ORDER — SODIUM CHLORIDE 0.9 % (FLUSH) 0.9 %
10 SYRINGE (ML) INJECTION
Status: CANCELLED | OUTPATIENT
Start: 2022-10-06

## 2022-10-06 RX ORDER — LIDOCAINE HYDROCHLORIDE 10 MG/ML
1 INJECTION, SOLUTION EPIDURAL; INFILTRATION; INTRACAUDAL; PERINEURAL ONCE
Status: CANCELLED | OUTPATIENT
Start: 2022-10-06 | End: 2022-10-06

## 2022-10-06 RX ORDER — LIDOCAINE HYDROCHLORIDE 10 MG/ML
1 INJECTION, SOLUTION EPIDURAL; INFILTRATION; INTRACAUDAL; PERINEURAL ONCE
Status: DISCONTINUED | OUTPATIENT
Start: 2022-10-06 | End: 2022-10-09

## 2022-10-06 RX ORDER — HYDROMORPHONE HYDROCHLORIDE 1 MG/ML
0.5 INJECTION, SOLUTION INTRAMUSCULAR; INTRAVENOUS; SUBCUTANEOUS EVERY 4 HOURS PRN
Status: CANCELLED | OUTPATIENT
Start: 2022-10-06

## 2022-10-06 RX ORDER — HYDROMORPHONE HYDROCHLORIDE 1 MG/ML
1 INJECTION, SOLUTION INTRAMUSCULAR; INTRAVENOUS; SUBCUTANEOUS EVERY 4 HOURS PRN
Status: DISCONTINUED | OUTPATIENT
Start: 2022-10-06 | End: 2022-10-09 | Stop reason: HOSPADM

## 2022-10-06 RX ORDER — ACETAMINOPHEN 325 MG/1
650 TABLET ORAL EVERY 8 HOURS PRN
Status: CANCELLED | OUTPATIENT
Start: 2022-10-06

## 2022-10-06 RX ORDER — METRONIDAZOLE 500 MG/100ML
500 INJECTION, SOLUTION INTRAVENOUS
Status: DISCONTINUED | OUTPATIENT
Start: 2022-10-06 | End: 2022-10-07

## 2022-10-06 RX ORDER — ONDANSETRON 2 MG/ML
4 INJECTION INTRAMUSCULAR; INTRAVENOUS EVERY 6 HOURS PRN
Status: CANCELLED | OUTPATIENT
Start: 2022-10-06

## 2022-10-06 RX ORDER — TALC
6 POWDER (GRAM) TOPICAL NIGHTLY PRN
Status: DISCONTINUED | OUTPATIENT
Start: 2022-10-06 | End: 2022-10-09 | Stop reason: HOSPADM

## 2022-10-06 RX ORDER — ONDANSETRON 2 MG/ML
4 INJECTION INTRAMUSCULAR; INTRAVENOUS EVERY 6 HOURS PRN
Status: DISCONTINUED | OUTPATIENT
Start: 2022-10-06 | End: 2022-10-09 | Stop reason: HOSPADM

## 2022-10-06 RX ORDER — TALC
6 POWDER (GRAM) TOPICAL NIGHTLY PRN
Status: CANCELLED | OUTPATIENT
Start: 2022-10-06

## 2022-10-06 RX ORDER — ACETAMINOPHEN 325 MG/1
650 TABLET ORAL EVERY 8 HOURS PRN
Status: DISCONTINUED | OUTPATIENT
Start: 2022-10-06 | End: 2022-10-09 | Stop reason: HOSPADM

## 2022-10-06 RX ORDER — METRONIDAZOLE 500 MG/1
500 TABLET ORAL EVERY 8 HOURS
Status: DISCONTINUED | OUTPATIENT
Start: 2022-10-06 | End: 2022-10-09 | Stop reason: HOSPADM

## 2022-10-06 RX ORDER — HYDROMORPHONE HYDROCHLORIDE 1 MG/ML
1 INJECTION, SOLUTION INTRAMUSCULAR; INTRAVENOUS; SUBCUTANEOUS EVERY 4 HOURS PRN
Status: CANCELLED | OUTPATIENT
Start: 2022-10-06

## 2022-10-06 RX ORDER — CIPROFLOXACIN 2 MG/ML
400 INJECTION, SOLUTION INTRAVENOUS
Status: DISCONTINUED | OUTPATIENT
Start: 2022-10-06 | End: 2022-10-06

## 2022-10-06 RX ORDER — CIPROFLOXACIN 2 MG/ML
400 INJECTION, SOLUTION INTRAVENOUS
Status: DISCONTINUED | OUTPATIENT
Start: 2022-10-06 | End: 2022-10-07

## 2022-10-06 RX ORDER — CIPROFLOXACIN 2 MG/ML
400 INJECTION, SOLUTION INTRAVENOUS
Status: CANCELLED | OUTPATIENT
Start: 2022-10-06

## 2022-10-06 RX ORDER — PROCHLORPERAZINE EDISYLATE 5 MG/ML
5 INJECTION INTRAMUSCULAR; INTRAVENOUS EVERY 6 HOURS PRN
Status: DISCONTINUED | OUTPATIENT
Start: 2022-10-06 | End: 2022-10-09 | Stop reason: HOSPADM

## 2022-10-06 RX ORDER — SODIUM CHLORIDE 0.9 % (FLUSH) 0.9 %
10 SYRINGE (ML) INJECTION
Status: DISCONTINUED | OUTPATIENT
Start: 2022-10-06 | End: 2022-10-09 | Stop reason: HOSPADM

## 2022-10-06 RX ORDER — HYDROMORPHONE HYDROCHLORIDE 1 MG/ML
0.5 INJECTION, SOLUTION INTRAMUSCULAR; INTRAVENOUS; SUBCUTANEOUS EVERY 4 HOURS PRN
Status: DISCONTINUED | OUTPATIENT
Start: 2022-10-06 | End: 2022-10-07

## 2022-10-06 RX ORDER — METRONIDAZOLE 500 MG/100ML
500 INJECTION, SOLUTION INTRAVENOUS
Status: CANCELLED | OUTPATIENT
Start: 2022-10-06

## 2022-10-06 RX ORDER — PROCHLORPERAZINE EDISYLATE 5 MG/ML
5 INJECTION INTRAMUSCULAR; INTRAVENOUS EVERY 6 HOURS PRN
Status: CANCELLED | OUTPATIENT
Start: 2022-10-06

## 2022-10-06 RX ADMIN — IOHEXOL 100 ML: 350 INJECTION, SOLUTION INTRAVENOUS at 02:10

## 2022-10-06 RX ADMIN — HYDROMORPHONE HYDROCHLORIDE 1 MG: 1 INJECTION, SOLUTION INTRAMUSCULAR; INTRAVENOUS; SUBCUTANEOUS at 04:10

## 2022-10-06 RX ADMIN — METRONIDAZOLE 500 MG: 500 TABLET ORAL at 09:10

## 2022-10-06 RX ADMIN — CIPROFLOXACIN 400 MG: 2 INJECTION, SOLUTION INTRAVENOUS at 05:10

## 2022-10-06 NOTE — PLAN OF CARE
Problem: Adult Inpatient Plan of Care  Goal: Plan of Care Review  10/6/2022 1643 by Adriana Lai RN  Outcome: Ongoing, Progressing  10/6/2022 1642 by Adriana Lai RN  Outcome: Ongoing, Progressing  Goal: Patient-Specific Goal (Individualized)  10/6/2022 1643 by Adriana Lai RN  Outcome: Ongoing, Progressing  10/6/2022 1642 by Adriana Lai RN  Outcome: Ongoing, Progressing  Goal: Absence of Hospital-Acquired Illness or Injury  10/6/2022 1643 by Adriana Lai RN  Outcome: Ongoing, Progressing  10/6/2022 1642 by Adriana Lai RN  Outcome: Ongoing, Progressing  Goal: Optimal Comfort and Wellbeing  10/6/2022 1643 by Adriana Lai RN  Outcome: Ongoing, Progressing  10/6/2022 1642 by Adriana Lai RN  Outcome: Ongoing, Progressing  Goal: Readiness for Transition of Care  10/6/2022 1643 by Adriana Lai RN  Outcome: Ongoing, Progressing  10/6/2022 1642 by Adriana Lai RN  Outcome: Ongoing, Progressing     Problem: Fluid and Electrolyte Imbalance (Acute Kidney Injury/Impairment)  Goal: Fluid and Electrolyte Balance  10/6/2022 1643 by Adriana Lai RN  Outcome: Ongoing, Progressing  10/6/2022 1642 by Adriana Lai RN  Outcome: Ongoing, Progressing     Problem: Oral Intake Inadequate (Acute Kidney Injury/Impairment)  Goal: Optimal Nutrition Intake  10/6/2022 1643 by Adriana Lai RN  Outcome: Ongoing, Progressing  10/6/2022 1642 by Adriana Lai RN  Outcome: Ongoing, Progressing     Problem: Renal Function Impairment (Acute Kidney Injury/Impairment)  Goal: Effective Renal Function  10/6/2022 1643 by Adriana Lai RN  Outcome: Ongoing, Progressing  10/6/2022 1642 by Adriana Lai RN  Outcome: Ongoing, Progressing     Problem: Bariatric Environmental Safety  Goal: Safety Maintained with Care  10/6/2022 1643 by Adriana Lai RN  Outcome: Ongoing, Progressing  10/6/2022 1642 by Adriana Lai  RN  Outcome: Ongoing, Progressing     Problem: Pain Acute  Goal: Acceptable Pain Control and Functional Ability  Outcome: Ongoing, Progressing

## 2022-10-07 ENCOUNTER — ANESTHESIA (OUTPATIENT)
Dept: SURGERY | Facility: HOSPITAL | Age: 27
End: 2022-10-07
Payer: MEDICAID

## 2022-10-07 ENCOUNTER — ANESTHESIA EVENT (OUTPATIENT)
Dept: SURGERY | Facility: HOSPITAL | Age: 27
End: 2022-10-07
Payer: MEDICAID

## 2022-10-07 LAB
ANION GAP SERPL CALC-SCNC: 5 MMOL/L (ref 8–16)
BASOPHILS # BLD AUTO: 0.04 K/UL (ref 0–0.2)
BASOPHILS NFR BLD: 0.2 % (ref 0–1.9)
BUN SERPL-MCNC: 10 MG/DL (ref 6–20)
CALCIUM SERPL-MCNC: 9 MG/DL (ref 8.7–10.5)
CHLORIDE SERPL-SCNC: 103 MMOL/L (ref 95–110)
CO2 SERPL-SCNC: 28 MMOL/L (ref 23–29)
CREAT SERPL-MCNC: 0.6 MG/DL (ref 0.5–1.4)
DIFFERENTIAL METHOD: ABNORMAL
EOSINOPHIL # BLD AUTO: 0 K/UL (ref 0–0.5)
EOSINOPHIL NFR BLD: 0.1 % (ref 0–8)
ERYTHROCYTE [DISTWIDTH] IN BLOOD BY AUTOMATED COUNT: 13.2 % (ref 11.5–14.5)
EST. GFR  (NO RACE VARIABLE): >60 ML/MIN/1.73 M^2
GLUCOSE SERPL-MCNC: 112 MG/DL (ref 70–110)
HCT VFR BLD AUTO: 37.5 % (ref 37–48.5)
HGB BLD-MCNC: 12.5 G/DL (ref 12–16)
IMM GRANULOCYTES # BLD AUTO: 0.08 K/UL (ref 0–0.04)
IMM GRANULOCYTES NFR BLD AUTO: 0.5 % (ref 0–0.5)
LYMPHOCYTES # BLD AUTO: 1.6 K/UL (ref 1–4.8)
LYMPHOCYTES NFR BLD: 9 % (ref 18–48)
MAGNESIUM SERPL-MCNC: 2.2 MG/DL (ref 1.6–2.6)
MCH RBC QN AUTO: 27.9 PG (ref 27–31)
MCHC RBC AUTO-ENTMCNC: 33.3 G/DL (ref 32–36)
MCV RBC AUTO: 84 FL (ref 82–98)
MONOCYTES # BLD AUTO: 1.2 K/UL (ref 0.3–1)
MONOCYTES NFR BLD: 7.2 % (ref 4–15)
NEUTROPHILS # BLD AUTO: 14.4 K/UL (ref 1.8–7.7)
NEUTROPHILS NFR BLD: 83 % (ref 38–73)
NRBC BLD-RTO: 0 /100 WBC
PLATELET # BLD AUTO: 304 K/UL (ref 150–450)
PMV BLD AUTO: 9.6 FL (ref 9.2–12.9)
POTASSIUM SERPL-SCNC: 3.3 MMOL/L (ref 3.5–5.1)
RBC # BLD AUTO: 4.48 M/UL (ref 4–5.4)
SODIUM SERPL-SCNC: 136 MMOL/L (ref 136–145)
WBC # BLD AUTO: 17.28 K/UL (ref 3.9–12.7)

## 2022-10-07 PROCEDURE — 87186 SC STD MICRODIL/AGAR DIL: CPT | Performed by: SURGERY

## 2022-10-07 PROCEDURE — 25000003 PHARM REV CODE 250: Performed by: ANESTHESIOLOGY

## 2022-10-07 PROCEDURE — 87077 CULTURE AEROBIC IDENTIFY: CPT | Performed by: SURGERY

## 2022-10-07 PROCEDURE — 36415 COLL VENOUS BLD VENIPUNCTURE: CPT | Performed by: SURGERY

## 2022-10-07 PROCEDURE — 37000008 HC ANESTHESIA 1ST 15 MINUTES: Performed by: SURGERY

## 2022-10-07 PROCEDURE — 87076 CULTURE ANAEROBE IDENT EACH: CPT | Performed by: SURGERY

## 2022-10-07 PROCEDURE — 85025 COMPLETE CBC W/AUTO DIFF WBC: CPT | Performed by: SURGERY

## 2022-10-07 PROCEDURE — 25000003 PHARM REV CODE 250: Performed by: SURGERY

## 2022-10-07 PROCEDURE — 87075 CULTR BACTERIA EXCEPT BLOOD: CPT | Performed by: SURGERY

## 2022-10-07 PROCEDURE — G0378 HOSPITAL OBSERVATION PER HR: HCPCS

## 2022-10-07 PROCEDURE — 83735 ASSAY OF MAGNESIUM: CPT | Performed by: SURGERY

## 2022-10-07 PROCEDURE — 00902 ANES ANORECTAL PX: CPT | Performed by: SURGERY

## 2022-10-07 PROCEDURE — 94761 N-INVAS EAR/PLS OXIMETRY MLT: CPT

## 2022-10-07 PROCEDURE — 99900031 HC PATIENT EDUCATION (STAT)

## 2022-10-07 PROCEDURE — 87070 CULTURE OTHR SPECIMN AEROBIC: CPT | Performed by: SURGERY

## 2022-10-07 PROCEDURE — 36000705 HC OR TIME LEV I EA ADD 15 MIN: Performed by: SURGERY

## 2022-10-07 PROCEDURE — 94799 UNLISTED PULMONARY SVC/PX: CPT

## 2022-10-07 PROCEDURE — 63600175 PHARM REV CODE 636 W HCPCS: Performed by: SURGERY

## 2022-10-07 PROCEDURE — 63600175 PHARM REV CODE 636 W HCPCS: Performed by: NURSE ANESTHETIST, CERTIFIED REGISTERED

## 2022-10-07 PROCEDURE — 80048 BASIC METABOLIC PNL TOTAL CA: CPT | Performed by: SURGERY

## 2022-10-07 PROCEDURE — 37000009 HC ANESTHESIA EA ADD 15 MINS: Performed by: SURGERY

## 2022-10-07 PROCEDURE — 99900035 HC TECH TIME PER 15 MIN (STAT)

## 2022-10-07 PROCEDURE — 36000704 HC OR TIME LEV I 1ST 15 MIN: Performed by: SURGERY

## 2022-10-07 PROCEDURE — 71000033 HC RECOVERY, INTIAL HOUR: Performed by: SURGERY

## 2022-10-07 RX ORDER — PROPOFOL 10 MG/ML
INJECTION, EMULSION INTRAVENOUS
Status: DISCONTINUED | OUTPATIENT
Start: 2022-10-07 | End: 2022-10-07

## 2022-10-07 RX ORDER — ONDANSETRON 2 MG/ML
4 INJECTION INTRAMUSCULAR; INTRAVENOUS DAILY PRN
Status: DISCONTINUED | OUTPATIENT
Start: 2022-10-07 | End: 2022-10-07

## 2022-10-07 RX ORDER — SODIUM CHLORIDE 9 MG/ML
INJECTION, SOLUTION INTRAVENOUS CONTINUOUS
Status: DISCONTINUED | OUTPATIENT
Start: 2022-10-07 | End: 2022-10-09 | Stop reason: HOSPADM

## 2022-10-07 RX ORDER — MIDAZOLAM HYDROCHLORIDE 1 MG/ML
INJECTION INTRAMUSCULAR; INTRAVENOUS
Status: DISCONTINUED | OUTPATIENT
Start: 2022-10-07 | End: 2022-10-07

## 2022-10-07 RX ORDER — HYDROMORPHONE HYDROCHLORIDE 1 MG/ML
0.5 INJECTION, SOLUTION INTRAMUSCULAR; INTRAVENOUS; SUBCUTANEOUS EVERY 5 MIN PRN
Status: DISCONTINUED | OUTPATIENT
Start: 2022-10-07 | End: 2022-10-07

## 2022-10-07 RX ORDER — DIPHENHYDRAMINE HYDROCHLORIDE 50 MG/ML
25 INJECTION INTRAMUSCULAR; INTRAVENOUS EVERY 6 HOURS PRN
Status: DISCONTINUED | OUTPATIENT
Start: 2022-10-07 | End: 2022-10-07

## 2022-10-07 RX ORDER — FENTANYL CITRATE 50 UG/ML
INJECTION, SOLUTION INTRAMUSCULAR; INTRAVENOUS
Status: DISCONTINUED | OUTPATIENT
Start: 2022-10-07 | End: 2022-10-07

## 2022-10-07 RX ORDER — MORPHINE SULFATE 4 MG/ML
4 INJECTION, SOLUTION INTRAMUSCULAR; INTRAVENOUS EVERY 5 MIN PRN
Status: DISCONTINUED | OUTPATIENT
Start: 2022-10-07 | End: 2022-10-07

## 2022-10-07 RX ORDER — ONDANSETRON 2 MG/ML
INJECTION INTRAMUSCULAR; INTRAVENOUS
Status: DISCONTINUED | OUTPATIENT
Start: 2022-10-07 | End: 2022-10-07

## 2022-10-07 RX ORDER — SODIUM CHLORIDE 9 MG/ML
INJECTION, SOLUTION INTRAVENOUS CONTINUOUS
Status: DISCONTINUED | OUTPATIENT
Start: 2022-10-07 | End: 2022-10-07

## 2022-10-07 RX ORDER — HYDROMORPHONE HYDROCHLORIDE 2 MG/ML
INJECTION, SOLUTION INTRAMUSCULAR; INTRAVENOUS; SUBCUTANEOUS
Status: DISCONTINUED | OUTPATIENT
Start: 2022-10-07 | End: 2022-10-07

## 2022-10-07 RX ORDER — BUPIVACAINE HYDROCHLORIDE AND EPINEPHRINE 5; 5 MG/ML; UG/ML
INJECTION, SOLUTION EPIDURAL; INTRACAUDAL; PERINEURAL
Status: DISCONTINUED | OUTPATIENT
Start: 2022-10-07 | End: 2022-10-07 | Stop reason: HOSPADM

## 2022-10-07 RX ORDER — HYDROCODONE BITARTRATE AND ACETAMINOPHEN 5; 325 MG/1; MG/1
1 TABLET ORAL EVERY 4 HOURS PRN
Status: DISCONTINUED | OUTPATIENT
Start: 2022-10-07 | End: 2022-10-09 | Stop reason: HOSPADM

## 2022-10-07 RX ORDER — DEXAMETHASONE SODIUM PHOSPHATE 4 MG/ML
INJECTION, SOLUTION INTRA-ARTICULAR; INTRALESIONAL; INTRAMUSCULAR; INTRAVENOUS; SOFT TISSUE
Status: DISCONTINUED | OUTPATIENT
Start: 2022-10-07 | End: 2022-10-07

## 2022-10-07 RX ADMIN — PROPOFOL 200 MG: 10 INJECTION, EMULSION INTRAVENOUS at 09:10

## 2022-10-07 RX ADMIN — HYDROMORPHONE HYDROCHLORIDE 1 MG: 1 INJECTION, SOLUTION INTRAMUSCULAR; INTRAVENOUS; SUBCUTANEOUS at 12:10

## 2022-10-07 RX ADMIN — FENTANYL CITRATE 100 MCG: 50 INJECTION, SOLUTION INTRAMUSCULAR; INTRAVENOUS at 08:10

## 2022-10-07 RX ADMIN — SODIUM CHLORIDE: 0.9 INJECTION, SOLUTION INTRAVENOUS at 08:10

## 2022-10-07 RX ADMIN — MIDAZOLAM 2 MG: 1 INJECTION INTRAMUSCULAR; INTRAVENOUS at 08:10

## 2022-10-07 RX ADMIN — DEXAMETHASONE SODIUM PHOSPHATE 4 MG: 4 INJECTION, SOLUTION INTRA-ARTICULAR; INTRALESIONAL; INTRAMUSCULAR; INTRAVENOUS; SOFT TISSUE at 09:10

## 2022-10-07 RX ADMIN — PIPERACILLIN AND TAZOBACTAM 4.5 G: 4; .5 INJECTION, POWDER, LYOPHILIZED, FOR SOLUTION INTRAVENOUS; PARENTERAL at 04:10

## 2022-10-07 RX ADMIN — HYDROMORPHONE HYDROCHLORIDE 1 MG: 2 INJECTION, SOLUTION INTRAMUSCULAR; INTRAVENOUS; SUBCUTANEOUS at 09:10

## 2022-10-07 RX ADMIN — METRONIDAZOLE 500 MG: 500 TABLET ORAL at 09:10

## 2022-10-07 RX ADMIN — FENTANYL CITRATE 50 MCG: 50 INJECTION, SOLUTION INTRAMUSCULAR; INTRAVENOUS at 09:10

## 2022-10-07 RX ADMIN — METRONIDAZOLE 500 MG: 500 TABLET ORAL at 05:10

## 2022-10-07 RX ADMIN — SODIUM CHLORIDE: 0.9 INJECTION, SOLUTION INTRAVENOUS at 02:10

## 2022-10-07 RX ADMIN — SODIUM CHLORIDE: 0.9 INJECTION, SOLUTION INTRAVENOUS at 10:10

## 2022-10-07 RX ADMIN — HYDROMORPHONE HYDROCHLORIDE 0.5 MG: 1 INJECTION, SOLUTION INTRAMUSCULAR; INTRAVENOUS; SUBCUTANEOUS at 04:10

## 2022-10-07 RX ADMIN — HYDROMORPHONE HYDROCHLORIDE 1 MG: 1 INJECTION, SOLUTION INTRAMUSCULAR; INTRAVENOUS; SUBCUTANEOUS at 04:10

## 2022-10-07 RX ADMIN — HYDROCODONE BITARTRATE AND ACETAMINOPHEN 1 TABLET: 5; 325 TABLET ORAL at 11:10

## 2022-10-07 RX ADMIN — PIPERACILLIN AND TAZOBACTAM 4.5 G: 4; .5 INJECTION, POWDER, LYOPHILIZED, FOR SOLUTION INTRAVENOUS; PARENTERAL at 08:10

## 2022-10-07 RX ADMIN — FENTANYL CITRATE 100 MCG: 50 INJECTION, SOLUTION INTRAMUSCULAR; INTRAVENOUS at 09:10

## 2022-10-07 RX ADMIN — METRONIDAZOLE 500 MG: 500 TABLET ORAL at 02:10

## 2022-10-07 RX ADMIN — ONDANSETRON 4 MG: 2 INJECTION INTRAMUSCULAR; INTRAVENOUS at 09:10

## 2022-10-07 RX ADMIN — CIPROFLOXACIN 400 MG: 2 INJECTION, SOLUTION INTRAVENOUS at 05:10

## 2022-10-07 NOTE — SUBJECTIVE & OBJECTIVE
Interval History: Some pain relief.  No drainage.    Medications:  Continuous Infusions:   sodium chloride 0.9%       Scheduled Meds:   LIDOcaine (PF) 10 mg/ml (1%)  1 mL Other Once    metroNIDAZOLE  500 mg Oral Q8H    piperacillin-tazobactam (ZOSYN) IVPB  4.5 g Intravenous Q8H     PRN Meds:acetaminophen, HYDROmorphone, HYDROmorphone, melatonin, metronidazole, ondansetron, prochlorperazine, sodium chloride 0.9%     Review of patient's allergies indicates:  No Known Allergies  Objective:     Vital Signs (Most Recent):  Temp: 99.3 °F (37.4 °C) (10/07/22 0720)  Pulse: 108 (10/07/22 0720)  Resp: 18 (10/07/22 0720)  BP: 110/70 (10/07/22 0720)  SpO2: (!) 94 % (10/07/22 0720)   Vital Signs (24h Range):  Temp:  [98.6 °F (37 °C)-101.1 °F (38.4 °C)] 99.3 °F (37.4 °C)  Pulse:  [108-124] 108  Resp:  [18-20] 18  SpO2:  [94 %-99 %] 94 %  BP: (110-131)/(66-81) 110/70     Weight: 112 kg (247 lb)  Body mass index is 43.75 kg/m².    Intake/Output - Last 3 Shifts         10/05 0700  10/06 0659 10/06 0700  10/07 0659 10/07 0700  10/08 0659    P.O.  980     Total Intake(mL/kg)  980 (8.8)     Urine (mL/kg/hr)  1000     Stool  0     Total Output  1000     Net  -20            Urine Occurrence  3 x             Physical Exam  Vitals and nursing note reviewed.   Constitutional:       General: She is not in acute distress.     Appearance: She is obese. She is not ill-appearing.   HENT:      Mouth/Throat:      Mouth: Mucous membranes are moist.      Pharynx: Oropharynx is clear.   Eyes:      Conjunctiva/sclera: Conjunctivae normal.      Pupils: Pupils are equal, round, and reactive to light.   Cardiovascular:      Rate and Rhythm: Normal rate and regular rhythm.      Pulses: Normal pulses.      Heart sounds: Normal heart sounds.   Pulmonary:      Effort: Pulmonary effort is normal. No respiratory distress.      Breath sounds: Normal breath sounds. No wheezing, rhonchi or rales.   Abdominal:      General: Bowel sounds are normal. There is no  distension.      Palpations: Abdomen is soft.      Tenderness: There is no abdominal tenderness.   Musculoskeletal:         General: No swelling. Normal range of motion.   Neurological:      General: No focal deficit present.      Mental Status: She is alert and oriented to person, place, and time.   Psychiatric:         Mood and Affect: Mood normal.         Behavior: Behavior normal.         Thought Content: Thought content normal.         Judgment: Judgment normal.       Significant Labs:  I have reviewed all pertinent lab results within the past 24 hours.  CBC:   Recent Labs   Lab 10/07/22  0844   WBC 17.28*   RBC 4.48   HGB 12.5   HCT 37.5      MCV 84   MCH 27.9   MCHC 33.3     BMP:   Recent Labs   Lab 10/06/22  1413   GLU 98      K 3.5      CO2 26   BUN 10   CREATININE 0.6   CALCIUM 9.3     CMP:   Recent Labs   Lab 10/06/22  1413   GLU 98   CALCIUM 9.3   ALBUMIN 3.6   PROT 7.8      K 3.5   CO2 26      BUN 10   CREATININE 0.6   ALKPHOS 63   ALT 20   AST 10   BILITOT 0.6       Significant Diagnostics:  I have reviewed all pertinent imaging results/findings within the past 24 hours.

## 2022-10-07 NOTE — PROGRESS NOTES
Pharmacist Renal Dose Adjustment Note    Chuyita Eli is a 26 y.o. female being treated with the medication zosyn    Patient Data:    Vital Signs (Most Recent):  Temp: 99.3 °F (37.4 °C) (10/07/22 0720)  Pulse: 108 (10/07/22 0720)  Resp: 18 (10/07/22 0720)  BP: 110/70 (10/07/22 0720)  SpO2: (!) 94 % (10/07/22 0720)   Vital Signs (72h Range):  Temp:  [98.6 °F (37 °C)-101.1 °F (38.4 °C)]   Pulse:  [108-124]   Resp:  [18-20]   BP: (110-131)/(66-81)   SpO2:  [94 %-99 %]      Recent Labs   Lab 10/06/22  1413   CREATININE 0.6     Serum creatinine: 0.6 mg/dL 10/06/22 1413  Estimated creatinine clearance: 167.9 mL/min    Medication:zosyn dose: 4.5 G frequency q12h will be changed to medication:zosyn dose:4.5 G frequency:q8h    Pharmacist's Name: Bisi Cardona  Pharmacist's Extension: [phone]

## 2022-10-07 NOTE — TRANSFER OF CARE
Anesthesia Transfer of Care Note    Patient: Chuyita Eli    Procedure(s) Performed: Procedure(s) (LRB):  Exam under anesthesia (N/A)    Patient location: PACU    Anesthesia Type: general    Transport from OR: Transported from OR on room air with adequate spontaneous ventilation    Post pain: adequate analgesia    Post assessment: no apparent anesthetic complications    Post vital signs: stable    Level of consciousness: awake    Nausea/Vomiting: no nausea/vomiting    Complications: none    Transfer of care protocol was followed      Last vitals:   HR 80  T 36.7  SPO2 100  BP 95/54  RR 16

## 2022-10-07 NOTE — OP NOTE
[facility] Surg  Surgery Department  Operative Note    SUMMARY     Date of Procedure: 10/7/2022     Procedure: Procedure(s) (LRB):  Exam under anesthesia (N/A)     Surgeon(s) and Role:     * Farideh Davenport MD - Primary    Assisting Surgeon: None    Pre-Operative Diagnosis: Perirectal abscess [K61.1]    Post-Operative Diagnosis: Post-Op Diagnosis Codes:     * Perirectal abscess [K61.1]    Anesthesia: General with local    Operative Findings (including complications, if any): intersphincteric abscess with opening anteriorly    Description of Technical Procedures: After informed consent was obtained including the risk of bleeding, pain, infection, scar, damage to sphincter muscle, the patient agreed to the procedure.  She was identified in the holding area and taken to OR A.  She underwent general endotracheal anesthesia.  She was placed in lithotomy.  The area was prepped with betadine and draped in a sterile fashion.  Time out was performed.  She was on therapeutic antibiotics that were administered.  The area of swelling and erythema was palpated and purulence drained from the anterior anus. Cultures were taken.  Rectal speculum was placed.  A tract was identified near anal verge anteriorly.  A fistula probe was placed to determine the position of the tract.  It was noted near the 2 o'clock position near orifice of canal and traversed the muscle.  A 15 blade was used to open the tract identified by the probe.  The blue vessel loop was tied to the probe and pulled through the tract.  The loop was tied to itself.  No additional purulence was expressed.  The area was anesthetized with local and a lidocaine coated gelfoam was placed in the canal.  The patient was awoken without incidence.  Dressing was placed over the perineum.          Intravenous Fluids:  400mL crystalloid    Estimated Blood Loss (EBL): 5mL           Implants: * No implants in log *    Specimens:   Specimen (24h ago, onward)      culture                     Condition: Good    Disposition: PACU - hemodynamically stable.    Attestation: I performed the procedure.

## 2022-10-07 NOTE — PLAN OF CARE
[facility]  Initial Discharge Assessment       Primary Care Provider: Tucker Gutierrez NP    Admission Diagnosis: Perirectal abscess [K61.1]    Admission Date: 10/6/2022  Expected Discharge Date:     Discharge Barriers Identified: None    Payor: MEDICAID / Plan: [place] [facility] CONNECT / Product Type: Managed Medicaid /     Extended Emergency Contact Information  Primary Emergency Contact: CRISTÓBAL JURADO  Mobile Phone: [phone]  Relation: Sister  Preferred language: English   needed? No    Discharge Plan A: Home with family  Discharge Plan B: Home with family      [facility] - [place], [place] - [address]  [address]  [address]  [place] [address]  Phone: [phone] Fax: [phone]      Initial Assessment (most recent)       Adult Discharge Assessment - 10/07/22 1058          Discharge Assessment    Assessment Type Discharge Planning Assessment     Confirmed/corrected address, phone number and insurance Yes     Source of Information patient     Does patient/caregiver understand observation status Yes     Communicated ANTONY with patient/caregiver No     Reason For Admission infection     Lives With child(lacie), dependent     Facility Arrived From: home     Do you expect to return to your current living situation? Yes     Do you have help at home or someone to help you manage your care at home? Yes     Who are your caregiver(s) and their phone number(s)? mother and sister.  omid bonilla mother     Prior to hospitilization cognitive status: Alert/Oriented     Current cognitive status: Alert/Oriented     Walking or Climbing Stairs Difficulty none     Dressing/Bathing Difficulty none     Home Layout Able to live on 1st floor     Equipment Currently Used at Home none     Readmission within 30 days? No     Patient currently being followed by outpatient case management? No     Do you currently have service(s) that help you manage your care at home? No     Do you take prescription  medications? No     Do you have any problems affording any of your prescribed medications? No     Who is going to help you get home at discharge? mother     How do you get to doctors appointments? family or friend will provide     Are you on dialysis? No     Do you take coumadin? No     Discharge Plan A Home with family     Discharge Plan B Home with family     DME Needed Upon Discharge  none     Discharge Plan discussed with: Patient     Discharge Barriers Identified None                   Patient is awake, responds appropriately to questions.  Confirms demographic information.  Lives with dependent children, plans to return home upon discharge,  States her mother and sister are going to be available to assist her when she returns home.  Independent adult.  No DME needs or DC needs identified this visit.

## 2022-10-07 NOTE — ANESTHESIA PREPROCEDURE EVALUATION
10/07/2022  Chuyita Eli is a 26 y.o., female.      Pre-op Assessment    I have reviewed the Patient Summary Reports.    I have reviewed the NPO Status.   I have reviewed the Medications.     Review of Systems  Anesthesia Hx:  No problems with previous Anesthesia  Denies Family Hx of Anesthesia complications.   Denies Personal Hx of Anesthesia complications.   Social:  Non-Smoker    Cardiovascular:  Cardiovascular Normal     Pulmonary:  Pulmonary Normal    Renal/:  Renal/ Normal     Hepatic/GI:  Hepatic/GI Normal    Neurological:  Neurology Normal    Endocrine:  Endocrine Normal      CMP  Sodium   Date Value Ref Range Status   10/06/2022 136 136 - 145 mmol/L Final     Potassium   Date Value Ref Range Status   10/06/2022 3.5 3.5 - 5.1 mmol/L Final     Chloride   Date Value Ref Range Status   10/06/2022 106 95 - 110 mmol/L Final     CO2   Date Value Ref Range Status   10/06/2022 26 23 - 29 mmol/L Final     Glucose   Date Value Ref Range Status   10/06/2022 98 70 - 110 mg/dL Final     BUN   Date Value Ref Range Status   10/06/2022 10 6 - 20 mg/dL Final     Creatinine   Date Value Ref Range Status   10/06/2022 0.6 0.5 - 1.4 mg/dL Final     Calcium   Date Value Ref Range Status   10/06/2022 9.3 8.7 - 10.5 mg/dL Final     Total Protein   Date Value Ref Range Status   10/06/2022 7.8 6.0 - 8.4 g/dL Final     Albumin   Date Value Ref Range Status   10/06/2022 3.6 3.5 - 5.2 g/dL Final     Total Bilirubin   Date Value Ref Range Status   10/06/2022 0.6 0.1 - 1.0 mg/dL Final     Comment:     For infants and newborns, interpretation of results should be based  on gestational age, weight and in agreement with clinical  observations.    Premature Infant recommended reference ranges:  Up to 24 hours.............<8.0 mg/dL  Up to 48 hours............<12.0 mg/dL  3-5 days..................<15.0 mg/dL  6-29  days.................<15.0 mg/dL    For patients on Eltrombopag therapy, use of Dimension [place] TBIL is   not   recommended.       Alkaline Phosphatase   Date Value Ref Range Status   10/06/2022 63 55 - 135 U/L Final     AST   Date Value Ref Range Status   10/06/2022 10 10 - 40 U/L Final     ALT   Date Value Ref Range Status   10/06/2022 20 10 - 44 U/L Final     Anion Gap   Date Value Ref Range Status   10/06/2022 4 (L) 8 - 16 mmol/L Final     eGFR if    Date Value Ref Range Status   11/24/2021 >60.0 >60 mL/min/1.73 m^2 Final     eGFR if non    Date Value Ref Range Status   11/24/2021 >60.0 >60 mL/min/1.73 m^2 Final     Comment:     Calculation used to obtain the estimated glomerular filtration  rate (eGFR) is the CKD-EPI equation.        Lab Results   Component Value Date    WBC 16.14 (H) 10/06/2022    HGB 13.0 10/06/2022    HCT 39.0 10/06/2022    MCV 84 10/06/2022     10/06/2022         Physical Exam  General: Well nourished    Airway:  Mallampati: III / II  Mouth Opening: Normal  TM Distance: Normal  Tongue: Normal  Neck ROM: Normal ROM    Dental:  Intact    Chest/Lungs:  Clear to auscultation    Heart:  Rate: Normal  Rhythm: Regular Rhythm  Sounds: Normal        Anesthesia Plan  Type of Anesthesia, risks & benefits discussed:    Anesthesia Type: Gen Supraglottic Airway  Intra-op Monitoring Plan: Standard ASA Monitors  Post Op Pain Control Plan: multimodal analgesia  Induction:  IV  Airway Plan: Direct  Informed Consent: Informed consent signed with the Patient and all parties understand the risks and agree with anesthesia plan.  All questions answered.   ASA Score: 2  Day of Surgery Review of History & Physical: I have interviewed and examined the patient. I have reviewed the patient's H&P dated: There are no significant changes.     Ready For Surgery From Anesthesia Perspective.     .

## 2022-10-07 NOTE — CARE UPDATE
Patient c/o itching and redness shortly after Cipro IVPB started infusing. IVPB stopped and Dr Davenport notified. No new orders rec'd.

## 2022-10-07 NOTE — PROGRESS NOTES
[facility]  General Surgery  Progress Note  10/7/22    Subjective:     History of Present Illness:  No notes on file    Post-Op Info:  Procedure(s) (LRB):  Exam under anesthesia (N/A)   Day of Surgery     Interval History: Some pain relief.  No drainage.    Medications:  Continuous Infusions:   sodium chloride 0.9%       Scheduled Meds:   LIDOcaine (PF) 10 mg/ml (1%)  1 mL Other Once    metroNIDAZOLE  500 mg Oral Q8H    piperacillin-tazobactam (ZOSYN) IVPB  4.5 g Intravenous Q8H     PRN Meds:acetaminophen, HYDROmorphone, HYDROmorphone, melatonin, metronidazole, ondansetron, prochlorperazine, sodium chloride 0.9%     Review of patient's allergies indicates:  No Known Allergies  Objective:     Vital Signs (Most Recent):  Temp: 99.3 °F (37.4 °C) (10/07/22 0720)  Pulse: 108 (10/07/22 0720)  Resp: 18 (10/07/22 0720)  BP: 110/70 (10/07/22 0720)  SpO2: (!) 94 % (10/07/22 0720)   Vital Signs (24h Range):  Temp:  [98.6 °F (37 °C)-101.1 °F (38.4 °C)] 99.3 °F (37.4 °C)  Pulse:  [108-124] 108  Resp:  [18-20] 18  SpO2:  [94 %-99 %] 94 %  BP: (110-131)/(66-81) 110/70     Weight: 112 kg (247 lb)  Body mass index is 43.75 kg/m².    Intake/Output - Last 3 Shifts         10/05 0700  10/06 0659 10/06 0700  10/07 0659 10/07 0700  10/08 0659    P.O.  980     Total Intake(mL/kg)  980 (8.8)     Urine (mL/kg/hr)  1000     Stool  0     Total Output  1000     Net  -20            Urine Occurrence  3 x             Physical Exam  Vitals and nursing note reviewed.   Constitutional:       General: She is not in acute distress.     Appearance: She is obese. She is not ill-appearing.   HENT:      Mouth/Throat:      Mouth: Mucous membranes are moist.      Pharynx: Oropharynx is clear.   Eyes:      Conjunctiva/sclera: Conjunctivae normal.      Pupils: Pupils are equal, round, and reactive to light.   Cardiovascular:      Rate and Rhythm: Normal rate and regular rhythm.      Pulses: Normal pulses.      Heart sounds: Normal heart sounds.    Pulmonary:      Effort: Pulmonary effort is normal. No respiratory distress.      Breath sounds: Normal breath sounds. No wheezing, rhonchi or rales.   Abdominal:      General: Bowel sounds are normal. There is no distension.      Palpations: Abdomen is soft.      Tenderness: There is no abdominal tenderness.   Musculoskeletal:         General: No swelling. Normal range of motion.   Neurological:      General: No focal deficit present.      Mental Status: She is alert and oriented to person, place, and time.   Psychiatric:         Mood and Affect: Mood normal.         Behavior: Behavior normal.         Thought Content: Thought content normal.         Judgment: Judgment normal.       Significant Labs:  I have reviewed all pertinent lab results within the past 24 hours.  CBC:   Recent Labs   Lab 10/07/22  0844   WBC 17.28*   RBC 4.48   HGB 12.5   HCT 37.5      MCV 84   MCH 27.9   MCHC 33.3     BMP:   Recent Labs   Lab 10/06/22  1413   GLU 98      K 3.5      CO2 26   BUN 10   CREATININE 0.6   CALCIUM 9.3     CMP:   Recent Labs   Lab 10/06/22  1413   GLU 98   CALCIUM 9.3   ALBUMIN 3.6   PROT 7.8      K 3.5   CO2 26      BUN 10   CREATININE 0.6   ALKPHOS 63   ALT 20   AST 10   BILITOT 0.6       Significant Diagnostics:  I have reviewed all pertinent imaging results/findings within the past 24 hours.    Assessment/Plan:     Perirectal abscess  Cont antibiotics  EUA today        Farideh Davenport MD  General Surgery  [facility]

## 2022-10-08 LAB
ANION GAP SERPL CALC-SCNC: 6 MMOL/L (ref 8–16)
BASOPHILS # BLD AUTO: 0.02 K/UL (ref 0–0.2)
BASOPHILS NFR BLD: 0.1 % (ref 0–1.9)
BUN SERPL-MCNC: 9 MG/DL (ref 6–20)
CALCIUM SERPL-MCNC: 8.7 MG/DL (ref 8.7–10.5)
CHLORIDE SERPL-SCNC: 107 MMOL/L (ref 95–110)
CO2 SERPL-SCNC: 26 MMOL/L (ref 23–29)
CREAT SERPL-MCNC: 0.6 MG/DL (ref 0.5–1.4)
DIFFERENTIAL METHOD: ABNORMAL
EOSINOPHIL # BLD AUTO: 0 K/UL (ref 0–0.5)
EOSINOPHIL NFR BLD: 0 % (ref 0–8)
ERYTHROCYTE [DISTWIDTH] IN BLOOD BY AUTOMATED COUNT: 13.1 % (ref 11.5–14.5)
EST. GFR  (NO RACE VARIABLE): >60 ML/MIN/1.73 M^2
GLUCOSE SERPL-MCNC: 113 MG/DL (ref 70–110)
HCT VFR BLD AUTO: 35.4 % (ref 37–48.5)
HGB BLD-MCNC: 11.7 G/DL (ref 12–16)
IMM GRANULOCYTES # BLD AUTO: 0.09 K/UL (ref 0–0.04)
IMM GRANULOCYTES NFR BLD AUTO: 0.5 % (ref 0–0.5)
LYMPHOCYTES # BLD AUTO: 1.6 K/UL (ref 1–4.8)
LYMPHOCYTES NFR BLD: 9.8 % (ref 18–48)
MCH RBC QN AUTO: 27.6 PG (ref 27–31)
MCHC RBC AUTO-ENTMCNC: 33.1 G/DL (ref 32–36)
MCV RBC AUTO: 84 FL (ref 82–98)
MONOCYTES # BLD AUTO: 0.9 K/UL (ref 0.3–1)
MONOCYTES NFR BLD: 5.6 % (ref 4–15)
NEUTROPHILS # BLD AUTO: 13.7 K/UL (ref 1.8–7.7)
NEUTROPHILS NFR BLD: 84 % (ref 38–73)
NRBC BLD-RTO: 0 /100 WBC
PLATELET # BLD AUTO: 341 K/UL (ref 150–450)
PMV BLD AUTO: 9.8 FL (ref 9.2–12.9)
POTASSIUM SERPL-SCNC: 3.3 MMOL/L (ref 3.5–5.1)
RBC # BLD AUTO: 4.24 M/UL (ref 4–5.4)
SODIUM SERPL-SCNC: 139 MMOL/L (ref 136–145)
WBC # BLD AUTO: 16.37 K/UL (ref 3.9–12.7)

## 2022-10-08 PROCEDURE — 94799 UNLISTED PULMONARY SVC/PX: CPT

## 2022-10-08 PROCEDURE — G0378 HOSPITAL OBSERVATION PER HR: HCPCS

## 2022-10-08 PROCEDURE — 99900035 HC TECH TIME PER 15 MIN (STAT)

## 2022-10-08 PROCEDURE — 80048 BASIC METABOLIC PNL TOTAL CA: CPT | Performed by: SURGERY

## 2022-10-08 PROCEDURE — 99900031 HC PATIENT EDUCATION (STAT)

## 2022-10-08 PROCEDURE — 85025 COMPLETE CBC W/AUTO DIFF WBC: CPT | Performed by: SURGERY

## 2022-10-08 PROCEDURE — 94761 N-INVAS EAR/PLS OXIMETRY MLT: CPT

## 2022-10-08 PROCEDURE — 25000003 PHARM REV CODE 250: Performed by: SURGERY

## 2022-10-08 PROCEDURE — 36415 COLL VENOUS BLD VENIPUNCTURE: CPT | Performed by: SURGERY

## 2022-10-08 PROCEDURE — 63600175 PHARM REV CODE 636 W HCPCS: Performed by: SURGERY

## 2022-10-08 RX ORDER — LANOLIN ALCOHOL/MO/W.PET/CERES
400 CREAM (GRAM) TOPICAL DAILY
Status: DISCONTINUED | OUTPATIENT
Start: 2022-10-08 | End: 2022-10-09 | Stop reason: HOSPADM

## 2022-10-08 RX ORDER — POTASSIUM CHLORIDE 20 MEQ/1
20 TABLET, EXTENDED RELEASE ORAL 2 TIMES DAILY
Status: DISCONTINUED | OUTPATIENT
Start: 2022-10-08 | End: 2022-10-09 | Stop reason: HOSPADM

## 2022-10-08 RX ADMIN — POTASSIUM CHLORIDE 20 MEQ: 1500 TABLET, EXTENDED RELEASE ORAL at 10:10

## 2022-10-08 RX ADMIN — HYDROCODONE BITARTRATE AND ACETAMINOPHEN 1 TABLET: 5; 325 TABLET ORAL at 10:10

## 2022-10-08 RX ADMIN — SODIUM CHLORIDE: 0.9 INJECTION, SOLUTION INTRAVENOUS at 01:10

## 2022-10-08 RX ADMIN — POTASSIUM CHLORIDE 20 MEQ: 1500 TABLET, EXTENDED RELEASE ORAL at 09:10

## 2022-10-08 RX ADMIN — METRONIDAZOLE 500 MG: 500 TABLET ORAL at 01:10

## 2022-10-08 RX ADMIN — HYDROCODONE BITARTRATE AND ACETAMINOPHEN 1 TABLET: 5; 325 TABLET ORAL at 09:10

## 2022-10-08 RX ADMIN — PIPERACILLIN AND TAZOBACTAM 4.5 G: 4; .5 INJECTION, POWDER, LYOPHILIZED, FOR SOLUTION INTRAVENOUS; PARENTERAL at 01:10

## 2022-10-08 RX ADMIN — PIPERACILLIN AND TAZOBACTAM 4.5 G: 4; .5 INJECTION, POWDER, LYOPHILIZED, FOR SOLUTION INTRAVENOUS; PARENTERAL at 05:10

## 2022-10-08 RX ADMIN — METRONIDAZOLE 500 MG: 500 TABLET ORAL at 05:10

## 2022-10-08 RX ADMIN — PIPERACILLIN AND TAZOBACTAM 4.5 G: 4; .5 INJECTION, POWDER, LYOPHILIZED, FOR SOLUTION INTRAVENOUS; PARENTERAL at 08:10

## 2022-10-08 RX ADMIN — METRONIDAZOLE 500 MG: 500 TABLET ORAL at 09:10

## 2022-10-08 RX ADMIN — Medication 400 MG: at 10:10

## 2022-10-08 RX ADMIN — HYDROMORPHONE HYDROCHLORIDE 1 MG: 1 INJECTION, SOLUTION INTRAMUSCULAR; INTRAVENOUS; SUBCUTANEOUS at 01:10

## 2022-10-08 NOTE — PROGRESS NOTES
[facility]  General Surgery  Progress Note  10/8/22    Subjective:     History of Present Illness:  No notes on file    Post-Op Info:  Procedure(s) (LRB):  Exam under anesthesia with drainage of perirectal abscess (N/A)   1 Day Post-Op     Interval History: Pain with BM.  Controlled prior to that.  Decreased appetite.    Medications:  Continuous Infusions:   sodium chloride 0.9% 125 mL/hr at 10/08/22 0125     Scheduled Meds:   LIDOcaine (PF) 10 mg/ml (1%)  1 mL Other Once    magnesium oxide  400 mg Oral Daily    metroNIDAZOLE  500 mg Oral Q8H    piperacillin-tazobactam (ZOSYN) IVPB  4.5 g Intravenous Q8H    potassium chloride  20 mEq Oral BID     PRN Meds:acetaminophen, HYDROcodone-acetaminophen, HYDROmorphone, melatonin, ondansetron, prochlorperazine, sodium chloride 0.9%     Review of patient's allergies indicates:  No Known Allergies  Objective:     Vital Signs (Most Recent):  Temp: 98.4 °F (36.9 °C) (10/08/22 1117)  Pulse: 101 (10/08/22 1117)  Resp: 18 (10/08/22 1339)  BP: 131/70 (10/08/22 1117)  SpO2: 98 % (10/08/22 1117) Vital Signs (24h Range):  Temp:  [97.8 °F (36.6 °C)-98.4 °F (36.9 °C)] 98.4 °F (36.9 °C)  Pulse:  [] 101  Resp:  [18-22] 18  SpO2:  [95 %-100 %] 98 %  BP: ()/(57-73) 131/70     Weight: 112 kg (247 lb)  Body mass index is 43.75 kg/m².    Intake/Output - Last 3 Shifts         10/06 0700  10/07 0659 10/07 0700  10/08 0659 10/08 0700  10/09 0659    P.O. 980 740     I.V. (mL/kg)  300 (2.7)     IV Piggyback  100     Total Intake(mL/kg) 980 (8.8) 1140 (10.2)     Urine (mL/kg/hr) 1000      Stool 0      Total Output 1000      Net -20 +1140            Urine Occurrence 3 x 3 x     Stool Occurrence  0 x             Physical Exam  Vitals and nursing note reviewed.   Constitutional:       General: She is not in acute distress.     Appearance: Normal appearance. She is not ill-appearing.   HENT:      Nose: Nose normal.      Mouth/Throat:      Mouth: Mucous membranes are moist.       Pharynx: Oropharynx is clear.   Eyes:      Extraocular Movements: Extraocular movements intact.      Conjunctiva/sclera: Conjunctivae normal.      Pupils: Pupils are equal, round, and reactive to light.   Cardiovascular:      Rate and Rhythm: Normal rate and regular rhythm.      Pulses: Normal pulses.      Heart sounds: Normal heart sounds.   Pulmonary:      Effort: Pulmonary effort is normal. No respiratory distress.      Breath sounds: Normal breath sounds. No wheezing, rhonchi or rales.   Abdominal:      General: Bowel sounds are normal. There is no distension.      Palpations: Abdomen is soft.      Tenderness: There is no abdominal tenderness. There is no guarding or rebound.   Musculoskeletal:         General: No swelling. Normal range of motion.      Cervical back: Normal range of motion and neck supple.   Skin:     Comments: No significant erythema to L buttock, induration improved   Neurological:      General: No focal deficit present.      Mental Status: She is alert and oriented to person, place, and time. Mental status is at baseline.   Psychiatric:         Mood and Affect: Mood normal.         Behavior: Behavior normal.         Thought Content: Thought content normal.         Judgment: Judgment normal.       Significant Labs:  I have reviewed all pertinent lab results within the past 24 hours.  CBC:   Recent Labs   Lab 10/08/22  0604   WBC 16.37*   RBC 4.24   HGB 11.7*   HCT 35.4*      MCV 84   MCH 27.6   MCHC 33.1     BMP:   Recent Labs   Lab 10/07/22  0844 10/08/22  0604   * 113*    139   K 3.3* 3.3*    107   CO2 28 26   BUN 10 9   CREATININE 0.6 0.6   CALCIUM 9.0 8.7   MG 2.2  --      CMP:   Recent Labs   Lab 10/06/22  1413 10/07/22  0844 10/08/22  0604   GLU 98   < > 113*   CALCIUM 9.3   < > 8.7   ALBUMIN 3.6  --   --    PROT 7.8  --   --       < > 139   K 3.5   < > 3.3*   CO2 26   < > 26      < > 107   BUN 10   < > 9   CREATININE 0.6   < > 0.6   ALKPHOS 63  --    --    ALT 20  --   --    AST 10  --   --    BILITOT 0.6  --   --     < > = values in this interval not displayed.       Significant Diagnostics:  I have reviewed all pertinent imaging results/findings within the past 24 hours.    Assessment/Plan:     * Perirectal abscess  Cont antibiotics  Perineal care  Pain control        Farideh Davenport MD  General Surgery  [facility]

## 2022-10-08 NOTE — SUBJECTIVE & OBJECTIVE
Interval History: Pain with BM.  Controlled prior to that.  Decreased appetite.    Medications:  Continuous Infusions:   sodium chloride 0.9% 125 mL/hr at 10/08/22 0125     Scheduled Meds:   LIDOcaine (PF) 10 mg/ml (1%)  1 mL Other Once    magnesium oxide  400 mg Oral Daily    metroNIDAZOLE  500 mg Oral Q8H    piperacillin-tazobactam (ZOSYN) IVPB  4.5 g Intravenous Q8H    potassium chloride  20 mEq Oral BID     PRN Meds:acetaminophen, HYDROcodone-acetaminophen, HYDROmorphone, melatonin, ondansetron, prochlorperazine, sodium chloride 0.9%     Review of patient's allergies indicates:  No Known Allergies  Objective:     Vital Signs (Most Recent):  Temp: 98.4 °F (36.9 °C) (10/08/22 1117)  Pulse: 101 (10/08/22 1117)  Resp: 18 (10/08/22 1339)  BP: 131/70 (10/08/22 1117)  SpO2: 98 % (10/08/22 1117) Vital Signs (24h Range):  Temp:  [97.8 °F (36.6 °C)-98.4 °F (36.9 °C)] 98.4 °F (36.9 °C)  Pulse:  [] 101  Resp:  [18-22] 18  SpO2:  [95 %-100 %] 98 %  BP: ()/(57-73) 131/70     Weight: 112 kg (247 lb)  Body mass index is 43.75 kg/m².    Intake/Output - Last 3 Shifts         10/06 0700  10/07 0659 10/07 0700  10/08 0659 10/08 0700  10/09 0659    P.O. 980 740     I.V. (mL/kg)  300 (2.7)     IV Piggyback  100     Total Intake(mL/kg) 980 (8.8) 1140 (10.2)     Urine (mL/kg/hr) 1000      Stool 0      Total Output 1000      Net -20 +1140            Urine Occurrence 3 x 3 x     Stool Occurrence  0 x             Physical Exam  Vitals and nursing note reviewed.   Constitutional:       General: She is not in acute distress.     Appearance: Normal appearance. She is not ill-appearing.   HENT:      Nose: Nose normal.      Mouth/Throat:      Mouth: Mucous membranes are moist.      Pharynx: Oropharynx is clear.   Eyes:      Extraocular Movements: Extraocular movements intact.      Conjunctiva/sclera: Conjunctivae normal.      Pupils: Pupils are equal, round, and reactive to light.   Cardiovascular:      Rate and Rhythm: Normal rate  and regular rhythm.      Pulses: Normal pulses.      Heart sounds: Normal heart sounds.   Pulmonary:      Effort: Pulmonary effort is normal. No respiratory distress.      Breath sounds: Normal breath sounds. No wheezing, rhonchi or rales.   Abdominal:      General: Bowel sounds are normal. There is no distension.      Palpations: Abdomen is soft.      Tenderness: There is no abdominal tenderness. There is no guarding or rebound.   Musculoskeletal:         General: No swelling. Normal range of motion.      Cervical back: Normal range of motion and neck supple.   Skin:     Comments: No significant erythema to L buttock, induration improved   Neurological:      General: No focal deficit present.      Mental Status: She is alert and oriented to person, place, and time. Mental status is at baseline.   Psychiatric:         Mood and Affect: Mood normal.         Behavior: Behavior normal.         Thought Content: Thought content normal.         Judgment: Judgment normal.       Significant Labs:  I have reviewed all pertinent lab results within the past 24 hours.  CBC:   Recent Labs   Lab 10/08/22  0604   WBC 16.37*   RBC 4.24   HGB 11.7*   HCT 35.4*      MCV 84   MCH 27.6   MCHC 33.1     BMP:   Recent Labs   Lab 10/07/22  0844 10/08/22  0604   * 113*    139   K 3.3* 3.3*    107   CO2 28 26   BUN 10 9   CREATININE 0.6 0.6   CALCIUM 9.0 8.7   MG 2.2  --      CMP:   Recent Labs   Lab 10/06/22  1413 10/07/22  0844 10/08/22  0604   GLU 98   < > 113*   CALCIUM 9.3   < > 8.7   ALBUMIN 3.6  --   --    PROT 7.8  --   --       < > 139   K 3.5   < > 3.3*   CO2 26   < > 26      < > 107   BUN 10   < > 9   CREATININE 0.6   < > 0.6   ALKPHOS 63  --   --    ALT 20  --   --    AST 10  --   --    BILITOT 0.6  --   --     < > = values in this interval not displayed.       Significant Diagnostics:  I have reviewed all pertinent imaging results/findings within the past 24 hours.

## 2022-10-08 NOTE — PLAN OF CARE
Problem: Adult Inpatient Plan of Care  Goal: Plan of Care Review  Outcome: Ongoing, Progressing  Flowsheets (Taken 10/8/2022 0448)  Plan of Care Reviewed With: patient  Goal: Optimal Comfort and Wellbeing  Outcome: Ongoing, Progressing  Intervention: Monitor Pain and Promote Comfort  Flowsheets (Taken 10/8/2022 0448)  Pain Management Interventions: pain management plan reviewed with patient/caregiver     Problem: Fluid and Electrolyte Imbalance (Acute Kidney Injury/Impairment)  Goal: Fluid and Electrolyte Balance  Outcome: Ongoing, Progressing  Intervention: Monitor and Manage Fluid and Electrolyte Balance  Flowsheets (Taken 10/8/2022 0448)  Fluid/Electrolyte Management: intravenous fluid replacement initiated     Problem: Bariatric Environmental Safety  Goal: Safety Maintained with Care  Outcome: Ongoing, Progressing  Intervention: Promote Safety and Comfort  Flowsheets (Taken 10/8/2022 0448)  Bariatric Safety: bariatric commode at bedside     Problem: Pain Acute  Goal: Acceptable Pain Control and Functional Ability  Outcome: Ongoing, Progressing  Intervention: Develop Pain Management Plan  Flowsheets (Taken 10/8/2022 0448)  Pain Management Interventions: pain management plan reviewed with patient/caregiver  Intervention: Prevent or Manage Pain  Flowsheets (Taken 10/8/2022 0448)  Sensory Stimulation Regulation: quiet environment promoted  Medication Review/Management: medications reviewed

## 2022-10-08 NOTE — NURSING
10/7/2022 7:42 PM   Patient care assumed from reporting nurse. Pt rounded on during bedside shift report. All questions answered. Introduced myself to patient as assigned RN for the current shift. Call light in reach, rails up x2, fall precautions reviewed with pt.  Pt asleep in bed. No needs at present. Advised pt to call with OOB assist to prevent falls, carola escalante.

## 2022-10-09 VITALS
BODY MASS INDEX: 43.77 KG/M2 | TEMPERATURE: 99 F | RESPIRATION RATE: 18 BRPM | WEIGHT: 247 LBS | SYSTOLIC BLOOD PRESSURE: 115 MMHG | HEIGHT: 63 IN | DIASTOLIC BLOOD PRESSURE: 75 MMHG | OXYGEN SATURATION: 99 % | HEART RATE: 92 BPM

## 2022-10-09 LAB
ALBUMIN SERPL BCP-MCNC: 3.1 G/DL (ref 3.5–5.2)
ALP SERPL-CCNC: 72 U/L (ref 55–135)
ALT SERPL W/O P-5'-P-CCNC: 20 U/L (ref 10–44)
ANION GAP SERPL CALC-SCNC: 5 MMOL/L (ref 8–16)
AST SERPL-CCNC: 12 U/L (ref 10–40)
BASOPHILS # BLD AUTO: 0.05 K/UL (ref 0–0.2)
BASOPHILS NFR BLD: 0.3 % (ref 0–1.9)
BILIRUB SERPL-MCNC: 0.4 MG/DL (ref 0.1–1)
BUN SERPL-MCNC: 8 MG/DL (ref 6–20)
CALCIUM SERPL-MCNC: 9.1 MG/DL (ref 8.7–10.5)
CHLORIDE SERPL-SCNC: 105 MMOL/L (ref 95–110)
CO2 SERPL-SCNC: 28 MMOL/L (ref 23–29)
CREAT SERPL-MCNC: 0.6 MG/DL (ref 0.5–1.4)
DIFFERENTIAL METHOD: ABNORMAL
EOSINOPHIL # BLD AUTO: 0 K/UL (ref 0–0.5)
EOSINOPHIL NFR BLD: 0 % (ref 0–8)
ERYTHROCYTE [DISTWIDTH] IN BLOOD BY AUTOMATED COUNT: 13.2 % (ref 11.5–14.5)
EST. GFR  (NO RACE VARIABLE): >60 ML/MIN/1.73 M^2
GLUCOSE SERPL-MCNC: 122 MG/DL (ref 70–110)
HCT VFR BLD AUTO: 37.6 % (ref 37–48.5)
HGB BLD-MCNC: 12.3 G/DL (ref 12–16)
IMM GRANULOCYTES # BLD AUTO: 0.12 K/UL (ref 0–0.04)
IMM GRANULOCYTES NFR BLD AUTO: 0.8 % (ref 0–0.5)
LYMPHOCYTES # BLD AUTO: 1.5 K/UL (ref 1–4.8)
LYMPHOCYTES NFR BLD: 10 % (ref 18–48)
MAGNESIUM SERPL-MCNC: 2.2 MG/DL (ref 1.6–2.6)
MCH RBC QN AUTO: 27.3 PG (ref 27–31)
MCHC RBC AUTO-ENTMCNC: 32.7 G/DL (ref 32–36)
MCV RBC AUTO: 83 FL (ref 82–98)
MONOCYTES # BLD AUTO: 0.9 K/UL (ref 0.3–1)
MONOCYTES NFR BLD: 5.8 % (ref 4–15)
NEUTROPHILS # BLD AUTO: 12.3 K/UL (ref 1.8–7.7)
NEUTROPHILS NFR BLD: 83.1 % (ref 38–73)
NRBC BLD-RTO: 0 /100 WBC
PLATELET # BLD AUTO: 374 K/UL (ref 150–450)
PMV BLD AUTO: 9.8 FL (ref 9.2–12.9)
POTASSIUM SERPL-SCNC: 3.5 MMOL/L (ref 3.5–5.1)
PROT SERPL-MCNC: 7.2 G/DL (ref 6–8.4)
RBC # BLD AUTO: 4.51 M/UL (ref 4–5.4)
SODIUM SERPL-SCNC: 138 MMOL/L (ref 136–145)
WBC # BLD AUTO: 14.77 K/UL (ref 3.9–12.7)

## 2022-10-09 PROCEDURE — 36415 COLL VENOUS BLD VENIPUNCTURE: CPT | Performed by: SURGERY

## 2022-10-09 PROCEDURE — 94761 N-INVAS EAR/PLS OXIMETRY MLT: CPT

## 2022-10-09 PROCEDURE — 83735 ASSAY OF MAGNESIUM: CPT | Performed by: SURGERY

## 2022-10-09 PROCEDURE — 80053 COMPREHEN METABOLIC PANEL: CPT | Performed by: SURGERY

## 2022-10-09 PROCEDURE — 25000003 PHARM REV CODE 250: Performed by: SURGERY

## 2022-10-09 PROCEDURE — 94799 UNLISTED PULMONARY SVC/PX: CPT

## 2022-10-09 PROCEDURE — 99900035 HC TECH TIME PER 15 MIN (STAT)

## 2022-10-09 PROCEDURE — G0378 HOSPITAL OBSERVATION PER HR: HCPCS

## 2022-10-09 PROCEDURE — 99900031 HC PATIENT EDUCATION (STAT)

## 2022-10-09 PROCEDURE — 63600175 PHARM REV CODE 636 W HCPCS: Performed by: SURGERY

## 2022-10-09 PROCEDURE — 85025 COMPLETE CBC W/AUTO DIFF WBC: CPT | Performed by: SURGERY

## 2022-10-09 RX ORDER — POTASSIUM CHLORIDE 20 MEQ/1
20 TABLET, EXTENDED RELEASE ORAL DAILY
Qty: 30 TABLET | Refills: 1 | Status: SHIPPED | OUTPATIENT
Start: 2022-10-09 | End: 2023-12-29

## 2022-10-09 RX ORDER — AMOXICILLIN AND CLAVULANATE POTASSIUM 875; 125 MG/1; MG/1
1 TABLET, FILM COATED ORAL EVERY 12 HOURS
Qty: 20 TABLET | Refills: 0 | Status: ON HOLD | OUTPATIENT
Start: 2022-10-09 | End: 2022-12-30 | Stop reason: HOSPADM

## 2022-10-09 RX ORDER — LANOLIN ALCOHOL/MO/W.PET/CERES
400 CREAM (GRAM) TOPICAL DAILY
Qty: 30 TABLET | Refills: 11 | Status: SHIPPED | OUTPATIENT
Start: 2022-10-10 | End: 2023-12-29

## 2022-10-09 RX ADMIN — PIPERACILLIN AND TAZOBACTAM 4.5 G: 4; .5 INJECTION, POWDER, LYOPHILIZED, FOR SOLUTION INTRAVENOUS; PARENTERAL at 08:10

## 2022-10-09 RX ADMIN — Medication 400 MG: at 08:10

## 2022-10-09 RX ADMIN — POTASSIUM CHLORIDE 20 MEQ: 1500 TABLET, EXTENDED RELEASE ORAL at 08:10

## 2022-10-09 RX ADMIN — METRONIDAZOLE 500 MG: 500 TABLET ORAL at 05:10

## 2022-10-09 RX ADMIN — Medication 1 PACKET: at 08:10

## 2022-10-09 RX ADMIN — PIPERACILLIN AND TAZOBACTAM 4.5 G: 4; .5 INJECTION, POWDER, LYOPHILIZED, FOR SOLUTION INTRAVENOUS; PARENTERAL at 12:10

## 2022-10-09 NOTE — PROGRESS NOTES
[facility]  General Surgery  Progress Note  10/9/22    Subjective:     History of Present Illness:  No notes on file    Post-Op Info:  Procedure(s) (LRB):  Exam under anesthesia with drainage of perirectal abscess (N/A)   2 Days Post-Op     Interval History: Brown foul smelling drainage.  ?Vaginal bleeding.  Pain better.  Still decreased appetite.    Medications:  Continuous Infusions:   sodium chloride 0.9% 125 mL/hr at 10/08/22 0125     Scheduled Meds:   magnesium oxide  400 mg Oral Daily    metroNIDAZOLE  500 mg Oral Q8H    piperacillin-tazobactam (ZOSYN) IVPB  4.5 g Intravenous Q8H    potassium chloride  20 mEq Oral BID    psyllium husk (with sugar)  1 packet Oral BID     PRN Meds:acetaminophen, HYDROcodone-acetaminophen, HYDROmorphone, melatonin, ondansetron, prochlorperazine, sodium chloride 0.9%     Review of patient's allergies indicates:  No Known Allergies  Objective:     Vital Signs (Most Recent):  Temp: 98.6 °F (37 °C) (10/09/22 1117)  Pulse: 90 (10/09/22 1117)  Resp: 20 (10/09/22 1117)  BP: 115/75 (10/09/22 1117)  SpO2: 99 % (10/09/22 1117) Vital Signs (24h Range):  Temp:  [98.3 °F (36.8 °C)-99.5 °F (37.5 °C)] 98.6 °F (37 °C)  Pulse:  [] 90  Resp:  [17-20] 20  SpO2:  [95 %-99 %] 99 %  BP: (108-128)/(62-76) 115/75     Weight: 112 kg (247 lb)  Body mass index is 43.75 kg/m².    Intake/Output - Last 3 Shifts         10/07 0700  10/08 0659 10/08 0700  10/09 0659 10/09 0700  10/10 0659    P.O. 740 1220     I.V. (mL/kg) 300 (2.7)      IV Piggyback 100      Total Intake(mL/kg) 1140 (10.2) 1220 (10.9)     Urine (mL/kg/hr)  2 (0)     Stool  0     Total Output  2     Net +1140 +1218            Urine Occurrence 3 x 2 x     Stool Occurrence 0 x 3 x             Physical Exam  Vitals and nursing note reviewed.   Constitutional:       General: She is not in acute distress.     Appearance: Normal appearance. She is not ill-appearing.   HENT:      Nose: Nose normal.      Mouth/Throat:      Mouth:  Mucous membranes are moist.      Pharynx: Oropharynx is clear.   Eyes:      Extraocular Movements: Extraocular movements intact.      Conjunctiva/sclera: Conjunctivae normal.      Pupils: Pupils are equal, round, and reactive to light.   Cardiovascular:      Rate and Rhythm: Normal rate and regular rhythm.      Pulses: Normal pulses.      Heart sounds: Normal heart sounds.   Pulmonary:      Effort: Pulmonary effort is normal. No respiratory distress.      Breath sounds: Normal breath sounds. No wheezing, rhonchi or rales.   Abdominal:      General: Bowel sounds are normal. There is no distension.      Palpations: Abdomen is soft.      Tenderness: There is no abdominal tenderness. There is no guarding or rebound.   Musculoskeletal:         General: No swelling. Normal range of motion.      Cervical back: Normal range of motion and neck supple.   Neurological:      General: No focal deficit present.      Mental Status: She is alert and oriented to person, place, and time. Mental status is at baseline.   Psychiatric:         Mood and Affect: Mood normal.         Behavior: Behavior normal.         Thought Content: Thought content normal.         Judgment: Judgment normal.       Significant Labs:  I have reviewed all pertinent lab results within the past 24 hours.  CBC:   Recent Labs   Lab 10/09/22  0956   WBC 14.77*   RBC 4.51   HGB 12.3   HCT 37.6      MCV 83   MCH 27.3   MCHC 32.7     BMP:   Recent Labs   Lab 10/09/22  0955   *      K 3.5      CO2 28   BUN 8   CREATININE 0.6   CALCIUM 9.1   MG 2.2     CMP:   Recent Labs   Lab 10/09/22  0955   *   CALCIUM 9.1   ALBUMIN 3.1*   PROT 7.2      K 3.5   CO2 28      BUN 8   CREATININE 0.6   ALKPHOS 72   ALT 20   AST 12   BILITOT 0.4       Significant Diagnostics:  I have reviewed all pertinent imaging results/findings within the past 24 hours.    Assessment/Plan:     * Perirectal abscess  Cont antibiotics  Perineal care  DC home  today        Farideh Davenport MD  General Surgery  [facility]

## 2022-10-09 NOTE — ANESTHESIA POSTPROCEDURE EVALUATION
Anesthesia Post Evaluation    Patient: Chuyita Eli    Procedure(s) Performed: Procedure(s) (LRB):  Exam under anesthesia with drainage of perirectal abscess (N/A)    Final Anesthesia Type: general      Patient location during evaluation: OPS  Patient participation: Yes- Able to Participate  Level of consciousness: awake  Post-procedure vital signs: reviewed and stable  Pain management: adequate  Airway patency: patent    PONV status at discharge: No PONV  Anesthetic complications: no      Cardiovascular status: blood pressure returned to baseline  Respiratory status: spontaneous ventilation  Hydration status: euvolemic  Follow-up not needed.          Vitals Value Taken Time   /68 10/08/22 1928   Temp 37.5 °C (99.5 °F) 10/08/22 1928   Pulse 107 10/08/22 2014   Resp 19 10/08/22 2014   SpO2 98 % 10/08/22 2014         Event Time   Out of Recovery 10/07/2022 10:12:00         Pain/Milind Score: Pain Rating Prior to Med Admin: 10 (10/8/2022  1:39 PM)  Pain Rating Post Med Admin: 2 (10/8/2022  2:09 PM)  Milind Score: 10 (10/7/2022 10:10 AM)

## 2022-10-09 NOTE — NURSING
10/8/2022 7:26 PM   Patient care assumed from reporting nurse. Pt rounded on during bedside shift report. All questions answered. Introduced myself to patient as assigned RN for the current shift. Call light in reach, rails up x2, fall precautions reviewed with pt.  Pt sitting up in bed trying to eat her dinner. She reports decreased appetite but states she will try to make herself eat something. Pain with ambulation and voiding. Fluids infusing without difficulty.     10/9/2022 5:58 AM   Pt stood up to go to bathroom and a moderate amount of tan/foul smelling liquids ran down her legs onto the floor. Cleaned pt up and got her in the shower. Pt also has bright red blood on her pad that pt states she is sure came from her vagina. Called  to report all of the above. She advised RN that the drainage was normal and what they would expect to see. MD also advised that pt will need a follow up with OBGYN. RN vu. MD advised to keep pads on patient for drainage. RN vu. No further orders at this time

## 2022-10-09 NOTE — DISCHARGE SUMMARY
[facility]  General Surgery  Discharge Summary      Patient Name: Chuyita Eli  MRN: [id number]  Admission Date: 10/6/2022  Hospital Length of Stay: 0 days  Discharge Date and Time:  10/09/2022 11:55 AM  Attending Physician: Farideh Davenport MD   Discharging Provider: Farideh Davenport MD  Primary Care Provider: Tucker Gutierrez NP     HPI: Admitted for anal pain and presumed recurrent perirectal abscess    Procedure(s) (LRB):  Exam under anesthesia with drainage of perirectal abscess (N/A)     Hospital Course: Pt admitted for pain and presumed perirectal abscess.  Labs reveal elevated WBC.  CT reveals intersphincteric abscess.  Given IV antibiotics.  Underwent EUA and seton placement for drainage of abscess.  Significant drainage with mobilizing to shower.   WBC improved and discharged home.  Encouraged for sitz baths.  Fiber supplements to bulk stool.      Consults: none    Significant Diagnostic Studies: Labs: BMP:   Recent Labs   Lab 10/08/22  0604 10/09/22  0955   * 122*    138   K 3.3* 3.5    105   CO2 26 28   BUN 9 8   CREATININE 0.6 0.6   CALCIUM 8.7 9.1   MG  --  2.2   , CMP   Recent Labs   Lab 10/08/22  0604 10/09/22  0955    138   K 3.3* 3.5    105   CO2 26 28   * 122*   BUN 9 8   CREATININE 0.6 0.6   CALCIUM 8.7 9.1   PROT  --  7.2   ALBUMIN  --  3.1*   BILITOT  --  0.4   ALKPHOS  --  72   AST  --  12   ALT  --  20   ANIONGAP 6* 5*   , and CBC   Recent Labs   Lab 10/08/22  0604 10/09/22  0956   WBC 16.37* 14.77*   HGB 11.7* 12.3   HCT 35.4* 37.6    374     Microbiology: Wound Culture: negative  Radiology: CT scan: CT ABDOMEN PELVIS WITH CONTRAST: No results found for this visit on 10/06/22. and CT ABDOMEN PELVIS WITHOUT CONTRAST: No results found for this visit on 10/06/22.    Pending Diagnostic Studies:       None          Final Active Diagnoses:    Diagnosis Date Noted POA    PRINCIPAL PROBLEM:  Perirectal abscess [K61.1] 11/09/2021 Yes       Problems Resolved During this Admission:      Discharged Condition: good    Disposition: Home or Self Care    Follow Up:   Follow-up Information       Farideh Davenport MD. Call in 1 week(s).    Specialty: General Surgery  Contact information:  [id number] [facility] [address] [place] [id number]  [phone]                           Patient Instructions:      Diet Adult Regular   Order Comments: High fiber     Pelvic Rest     Notify your health care provider if you experience any of the following:  temperature >100.4     Notify your health care provider if you experience any of the following:  persistent nausea and vomiting or diarrhea     Notify your health care provider if you experience any of the following:  severe uncontrolled pain     Notify your health care provider if you experience any of the following:  redness, tenderness, or signs of infection (pain, swelling, redness, odor or green/yellow discharge around incision site)     Notify your health care provider if you experience any of the following:  persistent dizziness, light-headedness, or visual disturbances     Notify your health care provider if you experience any of the following:  increased confusion or weakness     Notify your health care provider if you experience any of the following:  difficulty breathing or increased cough     Notify your health care provider if you experience any of the following:  worsening rash     Change dressing (specify)   Order Comments: Dressing change: Kourtney pad prn drainage  Sitz baths BID     Activity as tolerated     Medications:  Reconciled Home Medications:      Medication List        START taking these medications      amoxicillin-clavulanate 875-125mg 875-125 mg per tablet  Commonly known as: AUGMENTIN  Take 1 tablet by mouth every 12 (twelve) hours.     magnesium oxide 400 mg (241.3 mg magnesium) tablet  Commonly known as: MAG-OX  Take 1 tablet (400 mg total) by mouth once daily.  Start taking on: October  10, 2022     potassium chloride SA 20 MEQ tablet  Commonly known as: K-DUR,KLOR-CON  Take 1 tablet (20 mEq total) by mouth once daily.     psyllium husk (with sugar) 3.4 gram packet  Take 1 packet by mouth 2 (two) times daily.            STOP taking these medications      HYDROcodone-acetaminophen 5-325 mg per tablet  Commonly known as: NORCO     metroNIDAZOLE 500 MG tablet  Commonly known as: FLAGYL     moxifloxacin 400 mg tablet  Commonly known as: AVELOX              Farideh Davenport MD  General Surgery  [facility]

## 2022-10-09 NOTE — SUBJECTIVE & OBJECTIVE
Interval History: Brown foul smelling drainage.  ?Vaginal bleeding.  Pain better.  Still decreased appetite.    Medications:  Continuous Infusions:   sodium chloride 0.9% 125 mL/hr at 10/08/22 0125     Scheduled Meds:   magnesium oxide  400 mg Oral Daily    metroNIDAZOLE  500 mg Oral Q8H    piperacillin-tazobactam (ZOSYN) IVPB  4.5 g Intravenous Q8H    potassium chloride  20 mEq Oral BID    psyllium husk (with sugar)  1 packet Oral BID     PRN Meds:acetaminophen, HYDROcodone-acetaminophen, HYDROmorphone, melatonin, ondansetron, prochlorperazine, sodium chloride 0.9%     Review of patient's allergies indicates:  No Known Allergies  Objective:     Vital Signs (Most Recent):  Temp: 98.6 °F (37 °C) (10/09/22 1117)  Pulse: 90 (10/09/22 1117)  Resp: 20 (10/09/22 1117)  BP: 115/75 (10/09/22 1117)  SpO2: 99 % (10/09/22 1117) Vital Signs (24h Range):  Temp:  [98.3 °F (36.8 °C)-99.5 °F (37.5 °C)] 98.6 °F (37 °C)  Pulse:  [] 90  Resp:  [17-20] 20  SpO2:  [95 %-99 %] 99 %  BP: (108-128)/(62-76) 115/75     Weight: 112 kg (247 lb)  Body mass index is 43.75 kg/m².    Intake/Output - Last 3 Shifts         10/07 0700  10/08 0659 10/08 0700  10/09 0659 10/09 0700  10/10 0659    P.O. 740 1220     I.V. (mL/kg) 300 (2.7)      IV Piggyback 100      Total Intake(mL/kg) 1140 (10.2) 1220 (10.9)     Urine (mL/kg/hr)  2 (0)     Stool  0     Total Output  2     Net +1140 +1218            Urine Occurrence 3 x 2 x     Stool Occurrence 0 x 3 x             Physical Exam  Vitals and nursing note reviewed.   Constitutional:       General: She is not in acute distress.     Appearance: Normal appearance. She is not ill-appearing.   HENT:      Nose: Nose normal.      Mouth/Throat:      Mouth: Mucous membranes are moist.      Pharynx: Oropharynx is clear.   Eyes:      Extraocular Movements: Extraocular movements intact.      Conjunctiva/sclera: Conjunctivae normal.      Pupils: Pupils are equal, round, and reactive to light.   Cardiovascular:       Rate and Rhythm: Normal rate and regular rhythm.      Pulses: Normal pulses.      Heart sounds: Normal heart sounds.   Pulmonary:      Effort: Pulmonary effort is normal. No respiratory distress.      Breath sounds: Normal breath sounds. No wheezing, rhonchi or rales.   Abdominal:      General: Bowel sounds are normal. There is no distension.      Palpations: Abdomen is soft.      Tenderness: There is no abdominal tenderness. There is no guarding or rebound.   Musculoskeletal:         General: No swelling. Normal range of motion.      Cervical back: Normal range of motion and neck supple.   Neurological:      General: No focal deficit present.      Mental Status: She is alert and oriented to person, place, and time. Mental status is at baseline.   Psychiatric:         Mood and Affect: Mood normal.         Behavior: Behavior normal.         Thought Content: Thought content normal.         Judgment: Judgment normal.       Significant Labs:  I have reviewed all pertinent lab results within the past 24 hours.  CBC:   Recent Labs   Lab 10/09/22  0956   WBC 14.77*   RBC 4.51   HGB 12.3   HCT 37.6      MCV 83   MCH 27.3   MCHC 32.7     BMP:   Recent Labs   Lab 10/09/22  0955   *      K 3.5      CO2 28   BUN 8   CREATININE 0.6   CALCIUM 9.1   MG 2.2     CMP:   Recent Labs   Lab 10/09/22  0955   *   CALCIUM 9.1   ALBUMIN 3.1*   PROT 7.2      K 3.5   CO2 28      BUN 8   CREATININE 0.6   ALKPHOS 72   ALT 20   AST 12   BILITOT 0.4       Significant Diagnostics:  I have reviewed all pertinent imaging results/findings within the past 24 hours.

## 2022-10-09 NOTE — PLAN OF CARE
Plan of care discussed with patient at bedside. Prescription given to patient. Discharge instructions given. Patient verbalize understanding. Leaving unit via wheelchair with  and all belongings in hand.

## 2022-10-09 NOTE — PLAN OF CARE
Problem: Adult Inpatient Plan of Care  Goal: Plan of Care Review  Outcome: Ongoing, Progressing  Flowsheets (Taken 10/9/2022 0459)  Plan of Care Reviewed With: patient  Goal: Optimal Comfort and Wellbeing  Outcome: Ongoing, Progressing  Intervention: Monitor Pain and Promote Comfort  Flowsheets (Taken 10/9/2022 0459)  Pain Management Interventions: pain management plan reviewed with patient/caregiver     Problem: Oral Intake Inadequate (Acute Kidney Injury/Impairment)  Goal: Optimal Nutrition Intake  Outcome: Ongoing, Progressing  Intervention: Promote and Optimize Nutrition  Flowsheets (Taken 10/9/2022 0459)  Oral Nutrition Promotion: calorie-dense foods provided     Problem: Pain Acute  Goal: Acceptable Pain Control and Functional Ability  Outcome: Ongoing, Progressing  Intervention: Prevent or Manage Pain  Flowsheets (Taken 10/9/2022 0459)  Bowel Elimination Promotion: adequate fluid intake promoted  Medication Review/Management: medications reviewed

## 2022-10-11 LAB — BACTERIA SPEC AEROBE CULT: ABNORMAL

## 2022-10-12 LAB — BACTERIA SPEC ANAEROBE CULT: ABNORMAL

## 2022-12-30 ENCOUNTER — ANESTHESIA (OUTPATIENT)
Dept: SURGERY | Facility: HOSPITAL | Age: 27
End: 2022-12-30
Payer: MEDICAID

## 2022-12-30 ENCOUNTER — ANESTHESIA EVENT (OUTPATIENT)
Dept: SURGERY | Facility: HOSPITAL | Age: 27
End: 2022-12-30
Payer: MEDICAID

## 2022-12-30 ENCOUNTER — HOSPITAL ENCOUNTER (OUTPATIENT)
Facility: HOSPITAL | Age: 27
Discharge: HOME | End: 2022-12-30
Attending: EMERGENCY MEDICINE | Admitting: SURGERY
Payer: MEDICAID

## 2022-12-30 VITALS
RESPIRATION RATE: 18 BRPM | TEMPERATURE: 98 F | SYSTOLIC BLOOD PRESSURE: 108 MMHG | BODY MASS INDEX: 43.41 KG/M2 | DIASTOLIC BLOOD PRESSURE: 64 MMHG | WEIGHT: 245 LBS | HEIGHT: 63 IN | OXYGEN SATURATION: 97 % | HEART RATE: 98 BPM

## 2022-12-30 DIAGNOSIS — K35.30 ACUTE APPENDICITIS WITH LOCALIZED PERITONITIS, WITHOUT PERFORATION OR ABSCESS, UNSPECIFIED WHETHER GANGRENE PRESENT: Primary | ICD-10-CM

## 2022-12-30 LAB
ABO + RH BLD: NORMAL
ALBUMIN SERPL BCP-MCNC: 4.1 G/DL (ref 3.5–5.2)
ALP SERPL-CCNC: 62 U/L (ref 55–135)
ALT SERPL W/O P-5'-P-CCNC: 37 U/L (ref 10–44)
ANION GAP SERPL CALC-SCNC: 5 MMOL/L (ref 8–16)
AST SERPL-CCNC: 21 U/L (ref 10–40)
B-HCG UR QL: NEGATIVE
BASOPHILS # BLD AUTO: 0.03 K/UL (ref 0–0.2)
BASOPHILS NFR BLD: 0.2 % (ref 0–1.9)
BILIRUB SERPL-MCNC: 0.5 MG/DL (ref 0.1–1)
BILIRUB UR QL STRIP: NEGATIVE
BLD GP AB SCN CELLS X3 SERPL QL: NORMAL
BUN SERPL-MCNC: 12 MG/DL (ref 6–20)
CALCIUM SERPL-MCNC: 9.2 MG/DL (ref 8.7–10.5)
CHLORIDE SERPL-SCNC: 107 MMOL/L (ref 95–110)
CLARITY UR: CLEAR
CO2 SERPL-SCNC: 27 MMOL/L (ref 23–29)
COLOR UR: YELLOW
CREAT SERPL-MCNC: 0.7 MG/DL (ref 0.5–1.4)
DIFFERENTIAL METHOD: ABNORMAL
EOSINOPHIL # BLD AUTO: 0 K/UL (ref 0–0.5)
EOSINOPHIL NFR BLD: 0.1 % (ref 0–8)
ERYTHROCYTE [DISTWIDTH] IN BLOOD BY AUTOMATED COUNT: 13.5 % (ref 11.5–14.5)
EST. GFR  (NO RACE VARIABLE): >60 ML/MIN/1.73 M^2
GLUCOSE SERPL-MCNC: 112 MG/DL (ref 70–110)
GLUCOSE UR QL STRIP: NEGATIVE
HCT VFR BLD AUTO: 42.2 % (ref 37–48.5)
HGB BLD-MCNC: 14.1 G/DL (ref 12–16)
HGB UR QL STRIP: NEGATIVE
IMM GRANULOCYTES # BLD AUTO: 0.04 K/UL (ref 0–0.04)
IMM GRANULOCYTES NFR BLD AUTO: 0.3 % (ref 0–0.5)
KETONES UR QL STRIP: NEGATIVE
LEUKOCYTE ESTERASE UR QL STRIP: NEGATIVE
LIPASE SERPL-CCNC: 90 U/L (ref 23–300)
LYMPHOCYTES # BLD AUTO: 1.6 K/UL (ref 1–4.8)
LYMPHOCYTES NFR BLD: 12.7 % (ref 18–48)
MCH RBC QN AUTO: 27.7 PG (ref 27–31)
MCHC RBC AUTO-ENTMCNC: 33.4 G/DL (ref 32–36)
MCV RBC AUTO: 83 FL (ref 82–98)
MONOCYTES # BLD AUTO: 0.4 K/UL (ref 0.3–1)
MONOCYTES NFR BLD: 3.6 % (ref 4–15)
NEUTROPHILS # BLD AUTO: 10.2 K/UL (ref 1.8–7.7)
NEUTROPHILS NFR BLD: 83.1 % (ref 38–73)
NITRITE UR QL STRIP: NEGATIVE
NRBC BLD-RTO: 0 /100 WBC
PH UR STRIP: >8 [PH] (ref 5–8)
PLATELET # BLD AUTO: 336 K/UL (ref 150–450)
PMV BLD AUTO: 9.9 FL (ref 9.2–12.9)
POTASSIUM SERPL-SCNC: 3.4 MMOL/L (ref 3.5–5.1)
PROT SERPL-MCNC: 8.1 G/DL (ref 6–8.4)
PROT UR QL STRIP: NEGATIVE
RBC # BLD AUTO: 5.09 M/UL (ref 4–5.4)
SODIUM SERPL-SCNC: 139 MMOL/L (ref 136–145)
SP GR UR STRIP: 1.01 (ref 1–1.03)
URN SPEC COLLECT METH UR: ABNORMAL
UROBILINOGEN UR STRIP-ACNC: NEGATIVE EU/DL
WBC # BLD AUTO: 12.25 K/UL (ref 3.9–12.7)

## 2022-12-30 PROCEDURE — 00840 ANES IPER PX LOWER ABD NOS: CPT | Performed by: SURGERY

## 2022-12-30 PROCEDURE — 81003 URINALYSIS AUTO W/O SCOPE: CPT | Performed by: EMERGENCY MEDICINE

## 2022-12-30 PROCEDURE — 96367 TX/PROPH/DG ADDL SEQ IV INF: CPT | Mod: 59

## 2022-12-30 PROCEDURE — 96375 TX/PRO/DX INJ NEW DRUG ADDON: CPT

## 2022-12-30 PROCEDURE — 99900031 HC PATIENT EDUCATION (STAT)

## 2022-12-30 PROCEDURE — 96361 HYDRATE IV INFUSION ADD-ON: CPT | Mod: 59

## 2022-12-30 PROCEDURE — 37000008 HC ANESTHESIA 1ST 15 MINUTES: Performed by: SURGERY

## 2022-12-30 PROCEDURE — 36000708 HC OR TIME LEV III 1ST 15 MIN: Performed by: SURGERY

## 2022-12-30 PROCEDURE — 36415 COLL VENOUS BLD VENIPUNCTURE: CPT | Performed by: EMERGENCY MEDICINE

## 2022-12-30 PROCEDURE — 71000033 HC RECOVERY, INTIAL HOUR: Performed by: SURGERY

## 2022-12-30 PROCEDURE — 96365 THER/PROPH/DIAG IV INF INIT: CPT

## 2022-12-30 PROCEDURE — 25000003 PHARM REV CODE 250: Performed by: NURSE ANESTHETIST, CERTIFIED REGISTERED

## 2022-12-30 PROCEDURE — 83690 ASSAY OF LIPASE: CPT | Performed by: EMERGENCY MEDICINE

## 2022-12-30 PROCEDURE — 85025 COMPLETE CBC W/AUTO DIFF WBC: CPT | Performed by: EMERGENCY MEDICINE

## 2022-12-30 PROCEDURE — 25000003 PHARM REV CODE 250: Performed by: EMERGENCY MEDICINE

## 2022-12-30 PROCEDURE — 25500020 PHARM REV CODE 255: Performed by: EMERGENCY MEDICINE

## 2022-12-30 PROCEDURE — 87040 BLOOD CULTURE FOR BACTERIA: CPT | Performed by: EMERGENCY MEDICINE

## 2022-12-30 PROCEDURE — 86900 BLOOD TYPING SEROLOGIC ABO: CPT | Performed by: EMERGENCY MEDICINE

## 2022-12-30 PROCEDURE — 99900035 HC TECH TIME PER 15 MIN (STAT)

## 2022-12-30 PROCEDURE — 94799 UNLISTED PULMONARY SVC/PX: CPT

## 2022-12-30 PROCEDURE — 27201423 OPTIME MED/SURG SUP & DEVICES STERILE SUPPLY: Performed by: SURGERY

## 2022-12-30 PROCEDURE — 63600175 PHARM REV CODE 636 W HCPCS: Performed by: EMERGENCY MEDICINE

## 2022-12-30 PROCEDURE — 63600175 PHARM REV CODE 636 W HCPCS: Performed by: NURSE ANESTHETIST, CERTIFIED REGISTERED

## 2022-12-30 PROCEDURE — G0378 HOSPITAL OBSERVATION PER HR: HCPCS

## 2022-12-30 PROCEDURE — S0030 INJECTION, METRONIDAZOLE: HCPCS | Performed by: EMERGENCY MEDICINE

## 2022-12-30 PROCEDURE — 81025 URINE PREGNANCY TEST: CPT | Performed by: EMERGENCY MEDICINE

## 2022-12-30 PROCEDURE — 37000009 HC ANESTHESIA EA ADD 15 MINS: Performed by: SURGERY

## 2022-12-30 PROCEDURE — 99285 EMERGENCY DEPT VISIT HI MDM: CPT | Mod: 25

## 2022-12-30 PROCEDURE — 80053 COMPREHEN METABOLIC PANEL: CPT | Performed by: EMERGENCY MEDICINE

## 2022-12-30 PROCEDURE — 36000709 HC OR TIME LEV III EA ADD 15 MIN: Performed by: SURGERY

## 2022-12-30 RX ORDER — ROCURONIUM BROMIDE 10 MG/ML
INJECTION, SOLUTION INTRAVENOUS
Status: DISCONTINUED | OUTPATIENT
Start: 2022-12-30 | End: 2022-12-30

## 2022-12-30 RX ORDER — MIDAZOLAM HYDROCHLORIDE 1 MG/ML
INJECTION INTRAMUSCULAR; INTRAVENOUS
Status: DISCONTINUED | OUTPATIENT
Start: 2022-12-30 | End: 2022-12-30

## 2022-12-30 RX ORDER — POTASSIUM CHLORIDE 20 MEQ/1
20 TABLET, EXTENDED RELEASE ORAL DAILY
Status: DISCONTINUED | OUTPATIENT
Start: 2022-12-31 | End: 2022-12-30 | Stop reason: HOSPADM

## 2022-12-30 RX ORDER — ONDANSETRON 2 MG/ML
4 INJECTION INTRAMUSCULAR; INTRAVENOUS EVERY 6 HOURS PRN
Status: DISCONTINUED | OUTPATIENT
Start: 2022-12-30 | End: 2022-12-30 | Stop reason: HOSPADM

## 2022-12-30 RX ORDER — NEOSTIGMINE METHYLSULFATE 5 MG/5 ML
SYRINGE (ML) INTRAVENOUS
Status: DISCONTINUED | OUTPATIENT
Start: 2022-12-30 | End: 2022-12-30

## 2022-12-30 RX ORDER — PROPOFOL 10 MG/ML
VIAL (ML) INTRAVENOUS
Status: DISCONTINUED | OUTPATIENT
Start: 2022-12-30 | End: 2022-12-30

## 2022-12-30 RX ORDER — DEXAMETHASONE SODIUM PHOSPHATE 4 MG/ML
INJECTION, SOLUTION INTRA-ARTICULAR; INTRALESIONAL; INTRAMUSCULAR; INTRAVENOUS; SOFT TISSUE
Status: DISCONTINUED | OUTPATIENT
Start: 2022-12-30 | End: 2022-12-30

## 2022-12-30 RX ORDER — SODIUM CHLORIDE 9 MG/ML
INJECTION, SOLUTION INTRAVENOUS CONTINUOUS
Status: DISCONTINUED | OUTPATIENT
Start: 2022-12-30 | End: 2022-12-30 | Stop reason: HOSPADM

## 2022-12-30 RX ORDER — ONDANSETRON 2 MG/ML
4 INJECTION INTRAMUSCULAR; INTRAVENOUS DAILY PRN
Status: DISCONTINUED | OUTPATIENT
Start: 2022-12-30 | End: 2022-12-30

## 2022-12-30 RX ORDER — HYDROMORPHONE HYDROCHLORIDE 1 MG/ML
0.5 INJECTION, SOLUTION INTRAMUSCULAR; INTRAVENOUS; SUBCUTANEOUS EVERY 5 MIN PRN
Status: DISCONTINUED | OUTPATIENT
Start: 2022-12-30 | End: 2022-12-30

## 2022-12-30 RX ORDER — FENTANYL CITRATE 50 UG/ML
INJECTION, SOLUTION INTRAMUSCULAR; INTRAVENOUS
Status: DISCONTINUED | OUTPATIENT
Start: 2022-12-30 | End: 2022-12-30

## 2022-12-30 RX ORDER — METRONIDAZOLE 500 MG/100ML
500 INJECTION, SOLUTION INTRAVENOUS
Status: COMPLETED | OUTPATIENT
Start: 2022-12-30 | End: 2022-12-30

## 2022-12-30 RX ORDER — MORPHINE SULFATE 4 MG/ML
4 INJECTION, SOLUTION INTRAMUSCULAR; INTRAVENOUS EVERY 5 MIN PRN
Status: DISCONTINUED | OUTPATIENT
Start: 2022-12-30 | End: 2022-12-30

## 2022-12-30 RX ORDER — KETOROLAC TROMETHAMINE 30 MG/ML
15 INJECTION, SOLUTION INTRAMUSCULAR; INTRAVENOUS
Status: COMPLETED | OUTPATIENT
Start: 2022-12-30 | End: 2022-12-30

## 2022-12-30 RX ORDER — HYDROMORPHONE HYDROCHLORIDE 1 MG/ML
1 INJECTION, SOLUTION INTRAMUSCULAR; INTRAVENOUS; SUBCUTANEOUS EVERY 4 HOURS PRN
Status: DISCONTINUED | OUTPATIENT
Start: 2022-12-30 | End: 2022-12-30 | Stop reason: HOSPADM

## 2022-12-30 RX ORDER — LIDOCAINE HYDROCHLORIDE 10 MG/ML
INJECTION, SOLUTION INTRAVENOUS
Status: DISCONTINUED | OUTPATIENT
Start: 2022-12-30 | End: 2022-12-30

## 2022-12-30 RX ORDER — MORPHINE SULFATE 4 MG/ML
4 INJECTION, SOLUTION INTRAMUSCULAR; INTRAVENOUS
Status: COMPLETED | OUTPATIENT
Start: 2022-12-30 | End: 2022-12-30

## 2022-12-30 RX ORDER — HYDROCODONE BITARTRATE AND ACETAMINOPHEN 5; 325 MG/1; MG/1
1 TABLET ORAL EVERY 4 HOURS PRN
Status: DISCONTINUED | OUTPATIENT
Start: 2022-12-30 | End: 2022-12-30 | Stop reason: HOSPADM

## 2022-12-30 RX ORDER — GLYCOPYRROLATE 0.2 MG/ML
INJECTION, SOLUTION INTRAMUSCULAR; INTRAVENOUS
Status: DISCONTINUED | OUTPATIENT
Start: 2022-12-30 | End: 2022-12-30

## 2022-12-30 RX ORDER — ONDANSETRON HYDROCHLORIDE 2 MG/ML
INJECTION, SOLUTION INTRAMUSCULAR; INTRAVENOUS
Status: DISCONTINUED | OUTPATIENT
Start: 2022-12-30 | End: 2022-12-30

## 2022-12-30 RX ORDER — HYDROMORPHONE HYDROCHLORIDE 2 MG/ML
INJECTION, SOLUTION INTRAMUSCULAR; INTRAVENOUS; SUBCUTANEOUS
Status: DISCONTINUED | OUTPATIENT
Start: 2022-12-30 | End: 2022-12-30

## 2022-12-30 RX ORDER — SODIUM CHLORIDE 9 MG/ML
1000 INJECTION, SOLUTION INTRAVENOUS
Status: COMPLETED | OUTPATIENT
Start: 2022-12-30 | End: 2022-12-30

## 2022-12-30 RX ORDER — PROCHLORPERAZINE EDISYLATE 5 MG/ML
5 INJECTION INTRAMUSCULAR; INTRAVENOUS EVERY 6 HOURS PRN
Status: DISCONTINUED | OUTPATIENT
Start: 2022-12-30 | End: 2022-12-30 | Stop reason: HOSPADM

## 2022-12-30 RX ORDER — LANOLIN ALCOHOL/MO/W.PET/CERES
400 CREAM (GRAM) TOPICAL DAILY
Status: DISCONTINUED | OUTPATIENT
Start: 2022-12-31 | End: 2022-12-30 | Stop reason: HOSPADM

## 2022-12-30 RX ORDER — BUPIVACAINE HYDROCHLORIDE 5 MG/ML
INJECTION, SOLUTION PERINEURAL
Status: DISCONTINUED | OUTPATIENT
Start: 2022-12-30 | End: 2022-12-30 | Stop reason: HOSPADM

## 2022-12-30 RX ORDER — SODIUM CHLORIDE 9 MG/ML
INJECTION, SOLUTION INTRAVENOUS CONTINUOUS
Status: DISCONTINUED | OUTPATIENT
Start: 2022-12-30 | End: 2022-12-30

## 2022-12-30 RX ORDER — ACETAMINOPHEN 10 MG/ML
INJECTION, SOLUTION INTRAVENOUS
Status: DISCONTINUED | OUTPATIENT
Start: 2022-12-30 | End: 2022-12-30

## 2022-12-30 RX ADMIN — MORPHINE SULFATE 4 MG: 4 INJECTION INTRAVENOUS at 12:12

## 2022-12-30 RX ADMIN — HYDROMORPHONE HYDROCHLORIDE 1 MG: 2 INJECTION, SOLUTION INTRAMUSCULAR; INTRAVENOUS; SUBCUTANEOUS at 04:12

## 2022-12-30 RX ADMIN — ONDANSETRON 4 MG: 2 INJECTION INTRAMUSCULAR; INTRAVENOUS at 04:12

## 2022-12-30 RX ADMIN — SODIUM CHLORIDE 1000 ML: 9 INJECTION, SOLUTION INTRAVENOUS at 01:12

## 2022-12-30 RX ADMIN — SODIUM CHLORIDE 1000 ML: 9 INJECTION, SOLUTION INTRAVENOUS at 11:12

## 2022-12-30 RX ADMIN — GLYCOPYRROLATE 0.6 MG: 0.2 INJECTION, SOLUTION INTRAMUSCULAR; INTRAVENOUS at 04:12

## 2022-12-30 RX ADMIN — IOHEXOL 100 ML: 350 INJECTION, SOLUTION INTRAVENOUS at 11:12

## 2022-12-30 RX ADMIN — SODIUM CHLORIDE: 0.9 INJECTION, SOLUTION INTRAVENOUS at 04:12

## 2022-12-30 RX ADMIN — Medication 4 MG: at 04:12

## 2022-12-30 RX ADMIN — LIDOCAINE HYDROCHLORIDE 40 MG: 10 INJECTION, SOLUTION INTRAVENOUS at 03:12

## 2022-12-30 RX ADMIN — FENTANYL CITRATE 150 MCG: 50 INJECTION, SOLUTION INTRAMUSCULAR; INTRAVENOUS at 03:12

## 2022-12-30 RX ADMIN — Medication 150 MG: at 03:12

## 2022-12-30 RX ADMIN — CEFTRIAXONE SODIUM 1 G: 1 INJECTION, POWDER, FOR SOLUTION INTRAMUSCULAR; INTRAVENOUS at 01:12

## 2022-12-30 RX ADMIN — METRONIDAZOLE 500 MG: 5 INJECTION, SOLUTION INTRAVENOUS at 01:12

## 2022-12-30 RX ADMIN — Medication 50 MG: at 03:12

## 2022-12-30 RX ADMIN — SODIUM CHLORIDE: 0.9 INJECTION, SOLUTION INTRAVENOUS at 03:12

## 2022-12-30 RX ADMIN — DEXAMETHASONE SODIUM PHOSPHATE 4 MG: 4 INJECTION, SOLUTION INTRA-ARTICULAR; INTRALESIONAL; INTRAMUSCULAR; INTRAVENOUS; SOFT TISSUE at 04:12

## 2022-12-30 RX ADMIN — ROCURONIUM BROMIDE 35 MG: 10 INJECTION, SOLUTION INTRAVENOUS at 03:12

## 2022-12-30 RX ADMIN — KETOROLAC TROMETHAMINE 15 MG: 30 INJECTION, SOLUTION INTRAMUSCULAR at 11:12

## 2022-12-30 RX ADMIN — ACETAMINOPHEN 1000 MG: 10 INJECTION, SOLUTION INTRAVENOUS at 04:12

## 2022-12-30 RX ADMIN — ROCURONIUM BROMIDE 10 MG: 10 INJECTION, SOLUTION INTRAVENOUS at 04:12

## 2022-12-30 RX ADMIN — FENTANYL CITRATE 50 MCG: 50 INJECTION, SOLUTION INTRAMUSCULAR; INTRAVENOUS at 04:12

## 2022-12-30 RX ADMIN — MIDAZOLAM 2 MG: 1 INJECTION INTRAMUSCULAR; INTRAVENOUS at 03:12

## 2022-12-30 NOTE — OP NOTE
[facility] - Surgery  Appendectomy, Laparoscopic  Procedure Note    SUMMARY     Date of Procedure: 12/30/2022    Procedure: Procedure(s) (LRB):  APPENDECTOMY, LAPAROSCOPIC (N/A)  REPAIR, HERNIA, UMBILICAL    Surgeon(s) and Role:     * Farideh Davenport MD - Primary    Assisting Surgeon: None    Indications: The patient presented with a history of right-sided abdominal pain. A CT revealed findings consistent with acute appendicitis.    Pre-Operative Diagnosis: Acute appendicitis without mention of peritonitis    Post-Operative Diagnosis: Same    Anesthesia: General    Technical Procedures Used: stapled transection    Description of the Findings of the Procedure: exudate over distal appendix    The patient was seen again in the Holding Room. The risks, benefits, complications, treatment options, and expected outcomes were discussed with the patient and/or family. The possibilities of reaction to medication, pulmonary aspiration, perforation of viscus, bleeding, recurrent infection, finding a normal appendix, the need for additional procedures, failure to diagnose a condition, and creating a complication requiring transfusion or operation were discussed. There was concurrence with the proposed plan and informed consent was obtained. The site of surgery was properly noted/marked. The patient was taken to Operating Room, identified as Chuyita Eli and the procedure verified as Appendectomy. A Time Out was held and the above information confirmed.    The patient was placed in the supine position and general anesthesia was induced, along with placement of orogastric tube, Venodyne boots, and a Negro catheter. The abdomen was prepped and draped in a sterile fashion. A one centimeter supraumbilical incision was made and the peritoneal cavity was accessed using the Susan technique. The pneumoperitoneum was then established to steady pressure of 15 mmHg.  Additional 5 mm cannulas then placed in the left lower quadrant of  the abdomen and left midabdomen under direct vision. A careful evaluation of the entire abdomen was carried out. The patient was placed in Trendelenburg and left lateral decubitus position. The small intestines were retracted in the cephalad and left lateral direction away from the pelvis and right lower quadrant.   The patient was found to have an enlarged and inflamed appendix that was extending into the pelvis. There was no evidence of perforation.  There was exudate near the tip.    The appendix was carefully dissected. A window was made in the mesoappendix at the base of the appendix. A vascular load endo-BALWINDER was fired across the mesoappendix. The appendix was divided at its base using an endo-BALWINDER stapler. Minimal appendiceal stump was left in place. There was no evidence of bleeding, leakage, or complication after division of the appendix. The umbilical port site was closed using Polysorb sutures in a figure eight fashion at the level of the fascia after reducing the umbilical hernia sac. The trocar site skin wounds were closed using 4-0 Monocryl in a running subcuticular fashion.    Instrument, sponge, and needle counts were correct at the conclusion of the case.     The appendix was found to be inflamed. There were signs of necrosis.  There was not perforation. There was not abscess formation.    Complications: None; patient tolerated the procedure well.           Total IV Fluids: 1000mL           Estimated Blood Loss (EBL):  10mL    Drains: none    Implants: none    Specimens: appendix           Condition: stable    Disposition: PACU - hemodynamically stable.    Attestation: I performed the procedure.

## 2022-12-30 NOTE — ED PROVIDER NOTES
EMERGENCY DEPARTMENT HISTORY AND PHYSICAL EXAM     This note is dictated on M*Modal word recognition program.  There are word recognition mistakes and grammatical errors that are occasionally missed on review.     Date: 12/30/2022   Patient Name: Chuyita Eli       History of Presenting Illness      Chief Complaint   Patient presents with    Abdominal Pain     Pt stated that she began experiencing upper abdominal pain last night that is radiating down right side of abdomen. C/o nausea - denied vomiting/diarrhea. Denied dysuria. Last BM - this morning stated was difficult to pass initially.         1125   Chuyita Eli is a 27 y.o. female with PMHX of perirectal abscess, hysterectomy who presents to the emergency department C/O abdominal pain.    Patient reports epigastric abdominal pain that began last night now radiating to right side.  Patient says she feels like pain is in right hip.  Some nausea without vomiting.  Not actively nauseous.  Had some constipation this morning but was able to have a normal bowel movement.  Last meal was last night.  Does not have an appetite.  Pain made worse by body movement.      PCP: Tucker Gutierrez NP        Current Facility-Administered Medications   Medication Dose Route Frequency Provider Last Rate Last Admin    BUPivacaine injection    PRN Farideh Davenport MD   30 mL at 12/30/22 1515     Facility-Administered Medications Ordered in Other Encounters   Medication Dose Route Frequency Provider Last Rate Last Admin    dexAMETHasone injection   Intravenous PRN Marvin Montejo CRNA   4 mg at 12/30/22 1610    fentaNYL injection   Intravenous PRN Marvin Montejo CRNA   50 mcg at 12/30/22 1628    LIDOcaine (cardiac) PF 50 mg/5 ml (1%) injection   Intravenous PRN Marvin Montejo CRNA   40 mg at 12/30/22 1557    midazolam injection   Intravenous PRN Marvin Montejo CRNA   2 mg at 12/30/22 1553    propofol (DIPRIVAN) 10 mg/mL infusion   Intravenous PRN  Marvin Montejo CRNA   50 mg at 22 1559    rocuronium injection   Intravenous PRN Marvin Montejo CRNA   10 mg at 22 1628    sodium chloride 0.9% infusion   Intravenous Continuous PRN Marvin Montejo CRNA   New Bag at 22 1553           Past History     Past Medical History:   Past Medical History:   Diagnosis Date     section wound complication         Past Surgical History:   Past Surgical History:   Procedure Laterality Date    EXAMINATION UNDER ANESTHESIA N/A 10/7/2022    Procedure: Exam under anesthesia with drainage of perirectal abscess;  Surgeon: Farideh Davenport MD;  Location: [facility];  Service: General;  Laterality: N/A;    HYSTERECTOMY      INCISION AND DRAINAGE OF PERIRECTAL REGION Left 2021    Procedure: INCISION AND DRAINAGE, PERIRECTAL REGION;  Surgeon: Farideh Davenport MD;  Location: [facility];  Service: General;  Laterality: Left;        Family History:   Family History   Problem Relation Age of Onset    Diabetes Father     Hypertension Father         Social History:   Social History     Tobacco Use    Smoking status: Never   Substance Use Topics    Alcohol use: Never    Drug use: Never        Allergies:   Review of patient's allergies indicates:  No Known Allergies       Review of Systems   Review of Systems   Constitutional:  Negative for fever.   HENT:  Negative for sore throat.    Respiratory:  Negative for shortness of breath.    Cardiovascular:  Negative for chest pain.   Gastrointestinal:  Positive for abdominal pain, constipation and nausea. Negative for diarrhea and vomiting.   Genitourinary:  Negative for difficulty urinating, dysuria, hematuria and menstrual problem.   Musculoskeletal:  Negative for back pain.   Skin:  Negative for rash.   Neurological:  Negative for weakness.   Hematological:  Does not bruise/bleed easily.   All other systems reviewed and are negative.             Physical Exam     Vitals:    22 1112 22 1259 22  "1540   BP: (!) 118/95  112/65   BP Location: Right arm     Patient Position: Sitting     Pulse: 100  86   Resp: (!) 22 18 18   Temp: 98.3 °F (36.8 °C)  98.1 °F (36.7 °C)   TempSrc: Oral  Oral   SpO2: 100%  97%   Weight: 111.1 kg (245 lb)     Height: 5' 3" (1.6 m)        Physical Exam  Vitals and nursing note reviewed.   Constitutional:       General: She is not in acute distress.     Appearance: Normal appearance. She is well-groomed and overweight. She is not ill-appearing.   HENT:      Head: Normocephalic and atraumatic.      Right Ear: External ear normal.      Left Ear: External ear normal.      Nose: Nose normal. No congestion or rhinorrhea.      Mouth/Throat:      Mouth: Mucous membranes are moist.   Eyes:      Conjunctiva/sclera: Conjunctivae normal.      Pupils: Pupils are equal, round, and reactive to light.   Pulmonary:      Effort: Pulmonary effort is normal. No respiratory distress.   Abdominal:      General: Bowel sounds are normal.      Tenderness: There is abdominal tenderness in the right lower quadrant. There is no guarding or rebound. Positive signs include McBurney's sign. Negative signs include Martin's sign and Rovsing's sign.   Musculoskeletal:         General: No deformity. Normal range of motion.      Cervical back: Normal range of motion. No rigidity.   Skin:     General: Skin is warm and dry.   Neurological:      General: No focal deficit present.      Mental Status: She is alert and oriented to person, place, and time. Mental status is at baseline.   Psychiatric:         Mood and Affect: Mood normal.         Behavior: Behavior normal.            Diagnostic Study Results      Labs -   Recent Results (from the past 12 hour(s))   Urinalysis, Reflex to Urine Culture Urine, Clean Catch    Collection Time: 12/30/22 11:25 AM    Specimen: Urine, Clean Catch   Result Value Ref Range    Specimen UA Urine, Clean Catch     Color, UA Yellow Yellow, Straw, Marla    Appearance, UA Clear Clear    pH, UA " >8.0 (A) 5.0 - 8.0    Specific Gravity, UA 1.010 1.005 - 1.030    Protein, UA Negative Negative    Glucose, UA Negative Negative    Ketones, UA Negative Negative    Bilirubin (UA) Negative Negative    Occult Blood UA Negative Negative    Nitrite, UA Negative Negative    Urobilinogen, UA Negative <2.0 EU/dL    Leukocytes, UA Negative Negative   Pregnancy, urine rapid    Collection Time: 12/30/22 11:25 AM   Result Value Ref Range    Preg Test, Ur Negative    CBC W/ AUTO DIFFERENTIAL    Collection Time: 12/30/22 11:38 AM   Result Value Ref Range    WBC 12.25 3.90 - 12.70 K/uL    RBC 5.09 4.00 - 5.40 M/uL    Hemoglobin 14.1 12.0 - 16.0 g/dL    Hematocrit 42.2 37.0 - 48.5 %    MCV 83 82 - 98 fL    MCH 27.7 27.0 - 31.0 pg    MCHC 33.4 32.0 - 36.0 g/dL    RDW 13.5 11.5 - 14.5 %    Platelets 336 150 - 450 K/uL    MPV 9.9 9.2 - 12.9 fL    Immature Granulocytes 0.3 0.0 - 0.5 %    Gran # (ANC) 10.2 (H) 1.8 - 7.7 K/uL    Immature Grans (Abs) 0.04 0.00 - 0.04 K/uL    Lymph # 1.6 1.0 - 4.8 K/uL    Mono # 0.4 0.3 - 1.0 K/uL    Eos # 0.0 0.0 - 0.5 K/uL    Baso # 0.03 0.00 - 0.20 K/uL    nRBC 0 0 /100 WBC    Gran % 83.1 (H) 38.0 - 73.0 %    Lymph % 12.7 (L) 18.0 - 48.0 %    Mono % 3.6 (L) 4.0 - 15.0 %    Eosinophil % 0.1 0.0 - 8.0 %    Basophil % 0.2 0.0 - 1.9 %    Differential Method Automated    Comp. Metabolic Panel    Collection Time: 12/30/22 11:38 AM   Result Value Ref Range    Sodium 139 136 - 145 mmol/L    Potassium 3.4 (L) 3.5 - 5.1 mmol/L    Chloride 107 95 - 110 mmol/L    CO2 27 23 - 29 mmol/L    Glucose 112 (H) 70 - 110 mg/dL    BUN 12 6 - 20 mg/dL    Creatinine 0.7 0.5 - 1.4 mg/dL    Calcium 9.2 8.7 - 10.5 mg/dL    Total Protein 8.1 6.0 - 8.4 g/dL    Albumin 4.1 3.5 - 5.2 g/dL    Total Bilirubin 0.5 0.1 - 1.0 mg/dL    Alkaline Phosphatase 62 55 - 135 U/L    AST 21 10 - 40 U/L    ALT 37 10 - 44 U/L    Anion Gap 5 (L) 8 - 16 mmol/L    eGFR >60.0 >60 mL/min/1.73 m^2   Lipase    Collection Time: 12/30/22 11:38 AM   Result  Value Ref Range    Lipase Result 90 23 - 300 U/L   Type & Screen    Collection Time: 12/30/22  2:36 PM   Result Value Ref Range    Group & Rh A POS     Indirect Estefania NEG         Radiologic Studies -    CT Abdomen Pelvis With Contrast   Final Result      Evidence of acute appendicitis.      Bilateral nephrolithiasis as above.      2.9 cm probable hemangioma within the right hepatic lobe.  A follow-up multiphase contrast enhanced CT of the abdomen may be obtained on a nonemergent basis for further evaluation.         Electronically signed by: Darrell Elizabeth MD   Date:    12/30/2022   Time:    12:44           Medications given in the ED-   Medications   BUPivacaine injection (30 mLs Subcutaneous Given 12/30/22 1515)   ketorolac injection 15 mg (15 mg Intravenous Given 12/30/22 1141)   sodium chloride 0.9% bolus 1,000 mL 1,000 mL (0 mLs Intravenous Stopped 12/30/22 1241)   iohexoL (OMNIPAQUE 350) injection 100 mL (100 mLs Intravenous Given 12/30/22 1149)   morphine injection 4 mg (4 mg Intravenous Given 12/30/22 1259)   0.9%  NaCl infusion (1,000 mLs Intravenous New Bag 12/30/22 1312)   metronidazole IVPB 500 mg (0 mg Intravenous Stopped 12/30/22 1443)   cefTRIAXone (ROCEPHIN) 1 g in dextrose 5 % in water (D5W) 5 % 50 mL IVPB (MB+) (0 g Intravenous Stopped 12/30/22 1333)           Medical Decision Making    I am the first provider for this patient.     I reviewed the vital signs, available nursing notes, past medical history, past surgical history, family history and social history.     Vital Signs:  Reviewed the patient's vital signs.     Pulse Oximetry Analysis and Interpretation:    100% on Room Air, normal        External Test Results:    Records Reviewed: Nursing Notes, Current Prescription Medications, and Old Medical Records    History Obtained By: Patient    Provider Notes: Chuyita Eli is a 27 y.o. female with with migratory right lower quadrant pain.  No fever.  Plan on symptomatic management, labs,  imaging evaluate for acute appendicitis, abscess, colitis, diverticulitis      ED Course:    1:13 PM  Patient with uncomplicated acute appendicitis.  Antibiotics ordered.    Admit vs transfer depending on surgical coverage  Patient updated of results and plan.  Reports pain well controlled after Toradol.     2:23 PM  Dr. Mckeon to come in and see patient intake her to OR    Procedures:   Procedures       Diagnosis and Disposition       CLINICAL IMPRESSION:         1. Acute appendicitis with localized peritonitis, without perforation or abscess, unspecified whether gangrene present              PLAN:   1.  Admit for OR  2.      Medication List        ASK your doctor about these medications      amoxicillin-clavulanate 875-125mg 875-125 mg per tablet  Commonly known as: AUGMENTIN  Take 1 tablet by mouth every 12 (twelve) hours.     magnesium oxide 400 mg (241.3 mg magnesium) tablet  Commonly known as: MAG-OX  Take 1 tablet (400 mg total) by mouth once daily.     potassium chloride SA 20 MEQ tablet  Commonly known as: K-DUR,KLOR-CON  Take 1 tablet (20 mEq total) by mouth once daily.     psyllium husk (with sugar) 3.4 gram packet  Take 1 packet by mouth 2 (two) times daily.             3. No follow-up provider specified.     _______________________________     Please note that this dictation was completed with CrossReader, the computer voice recognition software.  Quite often unanticipated grammatical, syntax, homophones, and other interpretive errors are inadvertently transcribed by the computer software.  Please disregard these errors.  Please excuse any errors that have escaped final proofreading.             Bebeto Phillips MD  12/30/22 9605

## 2022-12-30 NOTE — ANESTHESIA PREPROCEDURE EVALUATION
12/30/2022  Chuyita Eli is a 27 y.o., female.      Pre-op Assessment    I have reviewed the Patient Summary Reports.    I have reviewed the NPO Status.   I have reviewed the Medications.     Review of Systems  Anesthesia Hx:  No problems with previous Anesthesia  Denies Family Hx of Anesthesia complications.   Denies Personal Hx of Anesthesia complications.   Cardiovascular:  Cardiovascular Normal     Pulmonary:  Pulmonary Normal    Hepatic/GI:  Hepatic/GI Normal    Neurological:  Neurology Normal    Endocrine:  Endocrine Normal  Morbid Obesity / BMI > 40      Physical Exam  General: Well nourished    Airway:  Mallampati: II / I  Mouth Opening: Normal  TM Distance: Normal  Tongue: Normal  Neck ROM: Normal ROM    Dental:  Intact    Chest/Lungs:  Clear to auscultation    Heart:  Rate: Normal  Rhythm: Regular Rhythm  Sounds: Normal        Anesthesia Plan  Type of Anesthesia, risks & benefits discussed:    Anesthesia Type: Gen ETT  Intra-op Monitoring Plan: Standard ASA Monitors  Post Op Pain Control Plan: multimodal analgesia  Induction:  IV  Airway Plan: Direct  Informed Consent: Informed consent signed with the Patient and all parties understand the risks and agree with anesthesia plan.  All questions answered.   ASA Score: 2 Emergent  Day of Surgery Review of History & Physical: I have interviewed and examined the patient. I have reviewed the patient's H&P dated: There are no significant changes.     Ready For Surgery From Anesthesia Perspective.     .

## 2022-12-30 NOTE — CONSULTS
[place] - Emergency Department  General Surgery  Consult Note    Patient Name: Chuyita Eli  MRN: [id number]  Code Status: Prior  Admission Date: 2022  Hospital Length of Stay: 0 days  Attending Physician: Bebeto Phillips MD  Primary Care Provider: Tucker Gutierrez NP    Patient information was obtained from patient and ER records.     Consults  Subjective:     Principal Problem: <principal problem not specified>    History of Present Illness: Pt reports 1 day history of abdominal pain.  Started in periumbilical/epigastric area.  Now localized in RLQ.  Nausea but no vomiting.  No diarrhea.  Mild constipation/irregularity but normal BM today.  No fevers or chills.  Hx of perirectal abscess but no new problems with that.         No current facility-administered medications on file prior to encounter.     Current Outpatient Medications on File Prior to Encounter   Medication Sig    amoxicillin-clavulanate 875-125mg (AUGMENTIN) 875-125 mg per tablet Take 1 tablet by mouth every 12 (twelve) hours.    magnesium oxide (MAG-OX) 400 mg (241.3 mg magnesium) tablet Take 1 tablet (400 mg total) by mouth once daily.    potassium chloride SA (K-DUR,KLOR-CON) 20 MEQ tablet Take 1 tablet (20 mEq total) by mouth once daily.    psyllium husk, with sugar, 3.4 gram packet Take 1 packet by mouth 2 (two) times daily.       Review of patient's allergies indicates:  No Known Allergies    Past Medical History:   Diagnosis Date     section wound complication      Past Surgical History:   Procedure Laterality Date    EXAMINATION UNDER ANESTHESIA N/A 10/7/2022    Procedure: Exam under anesthesia with drainage of perirectal abscess;  Surgeon: Farideh Davenport MD;  Location: [facility];  Service: General;  Laterality: N/A;    HYSTERECTOMY      INCISION AND DRAINAGE OF PERIRECTAL REGION Left 2021    Procedure: INCISION AND DRAINAGE, PERIRECTAL REGION;  Surgeon: Farideh Davenport MD;  Location: [facility] OR;   Service: General;  Laterality: Left;     Family History       Problem Relation (Age of Onset)    Diabetes Father    Hypertension Father          Tobacco Use    Smoking status: Never    Smokeless tobacco: Not on file   Substance and Sexual Activity    Alcohol use: Never    Drug use: Never    Sexual activity: Yes     Review of Systems   Constitutional:  Positive for appetite change. Negative for chills and fever.   Respiratory:  Negative for chest tightness, shortness of breath, wheezing and stridor.    Gastrointestinal:  Positive for abdominal pain, constipation and nausea. Negative for blood in stool, diarrhea, rectal pain and vomiting.   Musculoskeletal:  Negative for arthralgias and back pain.   Neurological:  Negative for dizziness, light-headedness and numbness.   Psychiatric/Behavioral:  Negative for confusion. The patient is not nervous/anxious.    All other systems reviewed and are negative.  Objective:     Vital Signs (Most Recent):  Temp: 98.3 °F (36.8 °C) (12/30/22 1112)  Pulse: 100 (12/30/22 1112)  Resp: 18 (12/30/22 1259)  BP: (!) 118/95 (12/30/22 1112)  SpO2: 100 % (12/30/22 1112)   Vital Signs (24h Range):  Temp:  [98.3 °F (36.8 °C)] 98.3 °F (36.8 °C)  Pulse:  [100] 100  Resp:  [18-22] 18  SpO2:  [100 %] 100 %  BP: (118)/(95) 118/95     Weight: 111.1 kg (245 lb)  Body mass index is 43.4 kg/m².    Physical Exam  Vitals and nursing note reviewed.   Constitutional:       General: She is not in acute distress.     Appearance: She is obese. She is ill-appearing. She is not toxic-appearing.   HENT:      Nose: Nose normal.      Mouth/Throat:      Mouth: Mucous membranes are moist.      Pharynx: Oropharynx is clear.   Eyes:      Extraocular Movements: Extraocular movements intact.      Conjunctiva/sclera: Conjunctivae normal.      Pupils: Pupils are equal, round, and reactive to light.   Cardiovascular:      Rate and Rhythm: Regular rhythm. Tachycardia present.      Heart sounds: Normal heart sounds.    Pulmonary:      Effort: Pulmonary effort is normal.      Breath sounds: Normal breath sounds.   Abdominal:      General: Bowel sounds are normal.      Palpations: Abdomen is soft.      Tenderness: There is abdominal tenderness (RLQ to palpation).      Hernia: No hernia is present.   Musculoskeletal:         General: No swelling. Normal range of motion.      Cervical back: Normal range of motion and neck supple.   Skin:     General: Skin is warm and dry.   Neurological:      General: No focal deficit present.      Mental Status: She is alert and oriented to person, place, and time. Mental status is at baseline.      Motor: No weakness.   Psychiatric:         Mood and Affect: Mood normal.         Behavior: Behavior normal.         Thought Content: Thought content normal.         Judgment: Judgment normal.       Significant Labs:  I have reviewed all pertinent lab results within the past 24 hours.  CBC:   Recent Labs   Lab 12/30/22  1138   WBC 12.25   RBC 5.09   HGB 14.1   HCT 42.2      MCV 83   MCH 27.7   MCHC 33.4     BMP:   Recent Labs   Lab 12/30/22  1138   *      K 3.4*      CO2 27   BUN 12   CREATININE 0.7   CALCIUM 9.2     CMP:   Recent Labs   Lab 12/30/22  1138   *   CALCIUM 9.2   ALBUMIN 4.1   PROT 8.1      K 3.4*   CO2 27      BUN 12   CREATININE 0.7   ALKPHOS 62   ALT 37   AST 21   BILITOT 0.5       Significant Diagnostics:  I have reviewed all pertinent imaging results/findings within the past 24 hours.  CT: I have reviewed all pertinent results/findings within the past 24 hours and my personal findings are:  distended appendix with inflammatory changes      Assessment/Plan:     Acute appendicitis with localized peritonitis, without perforation or gangrene  IV antibiotics  Plan appy      VTE Risk Mitigation (From admission, onward)    None          Thank you for your consult. I will follow-up with patient. Please contact us if you have any additional  questions.    Farideh Davenport MD  General Surgery  [place] - Emergency Department

## 2022-12-30 NOTE — SUBJECTIVE & OBJECTIVE
No current facility-administered medications on file prior to encounter.     Current Outpatient Medications on File Prior to Encounter   Medication Sig    amoxicillin-clavulanate 875-125mg (AUGMENTIN) 875-125 mg per tablet Take 1 tablet by mouth every 12 (twelve) hours.    magnesium oxide (MAG-OX) 400 mg (241.3 mg magnesium) tablet Take 1 tablet (400 mg total) by mouth once daily.    potassium chloride SA (K-DUR,KLOR-CON) 20 MEQ tablet Take 1 tablet (20 mEq total) by mouth once daily.    psyllium husk, with sugar, 3.4 gram packet Take 1 packet by mouth 2 (two) times daily.       Review of patient's allergies indicates:  No Known Allergies    Past Medical History:   Diagnosis Date     section wound complication      Past Surgical History:   Procedure Laterality Date    EXAMINATION UNDER ANESTHESIA N/A 10/7/2022    Procedure: Exam under anesthesia with drainage of perirectal abscess;  Surgeon: Farideh Davenport MD;  Location: [facility];  Service: General;  Laterality: N/A;    HYSTERECTOMY      INCISION AND DRAINAGE OF PERIRECTAL REGION Left 2021    Procedure: INCISION AND DRAINAGE, PERIRECTAL REGION;  Surgeon: Farideh Davenport MD;  Location: [facility];  Service: General;  Laterality: Left;     Family History       Problem Relation (Age of Onset)    Diabetes Father    Hypertension Father          Tobacco Use    Smoking status: Never    Smokeless tobacco: Not on file   Substance and Sexual Activity    Alcohol use: Never    Drug use: Never    Sexual activity: Yes     Review of Systems   Constitutional:  Positive for appetite change. Negative for chills and fever.   Respiratory:  Negative for chest tightness, shortness of breath, wheezing and stridor.    Gastrointestinal:  Positive for abdominal pain, constipation and nausea. Negative for blood in stool, diarrhea, rectal pain and vomiting.   Musculoskeletal:  Negative for arthralgias and back pain.   Neurological:  Negative for dizziness, light-headedness and  numbness.   Psychiatric/Behavioral:  Negative for confusion. The patient is not nervous/anxious.    All other systems reviewed and are negative.  Objective:     Vital Signs (Most Recent):  Temp: 98.3 °F (36.8 °C) (12/30/22 1112)  Pulse: 100 (12/30/22 1112)  Resp: 18 (12/30/22 1259)  BP: (!) 118/95 (12/30/22 1112)  SpO2: 100 % (12/30/22 1112)   Vital Signs (24h Range):  Temp:  [98.3 °F (36.8 °C)] 98.3 °F (36.8 °C)  Pulse:  [100] 100  Resp:  [18-22] 18  SpO2:  [100 %] 100 %  BP: (118)/(95) 118/95     Weight: 111.1 kg (245 lb)  Body mass index is 43.4 kg/m².    Physical Exam  Vitals and nursing note reviewed.   Constitutional:       General: She is not in acute distress.     Appearance: She is obese. She is ill-appearing. She is not toxic-appearing.   HENT:      Nose: Nose normal.      Mouth/Throat:      Mouth: Mucous membranes are moist.      Pharynx: Oropharynx is clear.   Eyes:      Extraocular Movements: Extraocular movements intact.      Conjunctiva/sclera: Conjunctivae normal.      Pupils: Pupils are equal, round, and reactive to light.   Cardiovascular:      Rate and Rhythm: Regular rhythm. Tachycardia present.      Heart sounds: Normal heart sounds.   Pulmonary:      Effort: Pulmonary effort is normal.      Breath sounds: Normal breath sounds.   Abdominal:      General: Bowel sounds are normal.      Palpations: Abdomen is soft.      Tenderness: There is abdominal tenderness (RLQ to palpation).      Hernia: No hernia is present.   Musculoskeletal:         General: No swelling. Normal range of motion.      Cervical back: Normal range of motion and neck supple.   Skin:     General: Skin is warm and dry.   Neurological:      General: No focal deficit present.      Mental Status: She is alert and oriented to person, place, and time. Mental status is at baseline.      Motor: No weakness.   Psychiatric:         Mood and Affect: Mood normal.         Behavior: Behavior normal.         Thought Content: Thought content  normal.         Judgment: Judgment normal.       Significant Labs:  I have reviewed all pertinent lab results within the past 24 hours.  CBC:   Recent Labs   Lab 12/30/22  1138   WBC 12.25   RBC 5.09   HGB 14.1   HCT 42.2      MCV 83   MCH 27.7   MCHC 33.4     BMP:   Recent Labs   Lab 12/30/22  1138   *      K 3.4*      CO2 27   BUN 12   CREATININE 0.7   CALCIUM 9.2     CMP:   Recent Labs   Lab 12/30/22  1138   *   CALCIUM 9.2   ALBUMIN 4.1   PROT 8.1      K 3.4*   CO2 27      BUN 12   CREATININE 0.7   ALKPHOS 62   ALT 37   AST 21   BILITOT 0.5       Significant Diagnostics:  I have reviewed all pertinent imaging results/findings within the past 24 hours.  CT: I have reviewed all pertinent results/findings within the past 24 hours and my personal findings are:  distended appendix with inflammatory changes

## 2022-12-30 NOTE — TRANSFER OF CARE
Anesthesia Transfer of Care Note    Patient: Chuyita Eli    Procedure(s) Performed: Procedure(s) (LRB):  APPENDECTOMY, LAPAROSCOPIC (N/A)  REPAIR, HERNIA, UMBILICAL    Patient location: PACU    Anesthesia Type: general    Transport from OR: Transported from OR on room air with adequate spontaneous ventilation    Post pain: adequate analgesia    Post assessment: no apparent anesthetic complications    Post vital signs: stable    Level of consciousness: awake    Nausea/Vomiting: no nausea/vomiting    Complications: none    Transfer of care protocol was followed      Last vitals:     RR 16   SPO2 94  T 36.7  BP 92/63

## 2022-12-30 NOTE — HPI
Pt reports 1 day history of abdominal pain.  Started in periumbilical/epigastric area.  Now localized in RLQ.  Nausea but no vomiting.  No diarrhea.  Mild constipation/irregularity but normal BM today.  No fevers or chills.  Hx of perirectal abscess but no new problems with that.

## 2022-12-31 NOTE — NURSING
Pt continues tolerating fluids without difficulty.   still unable to fill medication but pt states still wants to be discharged.  Dr. Davenport called and informed.  Will put in DC orders.

## 2022-12-31 NOTE — DISCHARGE INSTRUCTIONS
Drink plenty of fluids. Advance diet as tolerated.  Remove outer dressings in 24 hrs, but leave steristrips intact until they fall off on their own.  Notify MD if running fever above 100.4, unable to tolerate diet, excessive uncontrolled pain, drainage or foul odor from incisions.  Follow-up next week if office. Take showers only-no soaking in tub.  Call hospital or MD for any questions/concerns.

## 2022-12-31 NOTE — NURSING
Removed SL to R AC and SCDs for pending DC. Orange juice provided per request.  Offered to assist with dressing-pt wants to wait for husbands return.  Will DC infusing IV at that time to assure good tolerance of juice provided.

## 2022-12-31 NOTE — PLAN OF CARE
Pt awake in room. Introduced to new staff. Assess completed per flowsheet.  RT into room-IS initiated with good effort noted.  Pt has tolerated water without difficulty so far.  States  is trying to get med filled right now and wants to go home this evening.  Informed will call MD for discharge order once he is able to obtain medication and pt continues tolerating fluids well.  Understanding voiced.

## 2022-12-31 NOTE — NURSING
Pt has tolerated juice well.  IV to LH removed. Written and verbal discharge instructions reviewed with understanding voiced.  Pt denies any questions at this time.  States  is arriving now.  Instructed to call when dressed and ready to go.

## 2023-01-02 NOTE — ANESTHESIA POSTPROCEDURE EVALUATION
Anesthesia Post Evaluation    Patient: Chuyita Eli    Procedure(s) Performed: Procedure(s) (LRB):  APPENDECTOMY, LAPAROSCOPIC (N/A)  REPAIR, HERNIA, UMBILICAL    Final Anesthesia Type: general      Patient location during evaluation: med/surg floor  Patient participation: Yes- Able to Participate  Level of consciousness: awake  Post-procedure vital signs: reviewed and stable  Pain management: adequate  Airway patency: patent    PONV status at discharge: No PONV  Anesthetic complications: no      Cardiovascular status: blood pressure returned to baseline  Respiratory status: spontaneous ventilation  Hydration status: euvolemic  Follow-up not needed.          Vitals Value Taken Time   /64 12/30/22 1930   Temp 36.6 °C (97.8 °F) 12/30/22 1930   Pulse 98 12/30/22 1930   Resp 18 12/30/22 1930   SpO2 97 % 12/30/22 1930         Event Time   Out of Recovery 17:20:37         Pain/Milind Score: No data recorded

## 2023-01-04 LAB — SPECIMEN TO PATHOLOGY - SURGICAL: NORMAL

## 2023-01-05 LAB — BACTERIA BLD CULT: NORMAL

## 2023-01-26 DIAGNOSIS — K35.30 ACUTE APPENDICITIS WITH LOCALIZED PERITONITIS, WITHOUT PERFORATION, ABSCESS, OR GANGRENE: ICD-10-CM

## 2023-01-26 DIAGNOSIS — R10.9 ABDOMINAL PAIN, UNSPECIFIED ABDOMINAL LOCATION: Primary | ICD-10-CM

## 2023-02-02 NOTE — DISCHARGE SUMMARY
[facility] - Labor And Delivery  Discharge Note  Short Stay    Procedure(s) (LRB):  APPENDECTOMY, LAPAROSCOPIC (N/A)  REPAIR, HERNIA, UMBILICAL      OUTCOME: Patient tolerated treatment/procedure well without complication and is now ready for discharge.    DISPOSITION: Home or Self Care    FINAL DIAGNOSIS:  Acute appendicitis with localized peritonitis, without perforation or abscess    FOLLOWUP: In clinic    DISCHARGE INSTRUCTIONS:    Discharge Procedure Orders   Diet Adult Regular     Lifting restrictions   Order Comments: No lifting greater than 10 pounds for 6 weeks     Notify your health care provider if you experience any of the following:  temperature >100.4     Notify your health care provider if you experience any of the following:  severe uncontrolled pain     Notify your health care provider if you experience any of the following:  persistent nausea and vomiting or diarrhea     Notify your health care provider if you experience any of the following:  redness, tenderness, or signs of infection (pain, swelling, redness, odor or green/yellow discharge around incision site)     Notify your health care provider if you experience any of the following:  difficulty breathing or increased cough     Notify your health care provider if you experience any of the following:  persistent dizziness, light-headedness, or visual disturbances     Notify your health care provider if you experience any of the following:  increased confusion or weakness     Remove dressing in 24 hours   Order Comments: Allow steristrips to fall off     Shower on day dressing removed (No bath)         Clinical Reference Documents Added to Patient Instructions         Document    APPENDECTOMY, LAPAROSCOPIC SURGERY DISCHARGE INSTRUCTIONS (ENGLISH)    APPENDICITIS DISCHARGE INSTRUCTIONS, ADULT (ENGLISH)            TIME SPENT ON DISCHARGE: 20 minutes

## 2023-12-26 ENCOUNTER — HOSPITAL ENCOUNTER (EMERGENCY)
Facility: HOSPITAL | Age: 28
Discharge: HOME | End: 2023-12-26
Attending: EMERGENCY MEDICINE
Payer: MEDICAID

## 2023-12-26 VITALS
SYSTOLIC BLOOD PRESSURE: 126 MMHG | WEIGHT: 242 LBS | HEART RATE: 84 BPM | OXYGEN SATURATION: 100 % | TEMPERATURE: 99 F | BODY MASS INDEX: 42.88 KG/M2 | RESPIRATION RATE: 20 BRPM | HEIGHT: 63 IN | DIASTOLIC BLOOD PRESSURE: 85 MMHG

## 2023-12-26 DIAGNOSIS — L02.91 ABSCESS: Primary | ICD-10-CM

## 2023-12-26 PROCEDURE — 96372 THER/PROPH/DIAG INJ SC/IM: CPT | Performed by: NURSE PRACTITIONER

## 2023-12-26 PROCEDURE — 63600175 PHARM REV CODE 636 W HCPCS: Performed by: NURSE PRACTITIONER

## 2023-12-26 PROCEDURE — 99284 EMERGENCY DEPT VISIT MOD MDM: CPT

## 2023-12-26 RX ORDER — CLINDAMYCIN HYDROCHLORIDE 300 MG/1
300 CAPSULE ORAL EVERY 8 HOURS
Qty: 30 CAPSULE | Refills: 0 | Status: SHIPPED | OUTPATIENT
Start: 2023-12-26 | End: 2024-01-05

## 2023-12-26 RX ORDER — KETOROLAC TROMETHAMINE 30 MG/ML
30 INJECTION, SOLUTION INTRAMUSCULAR; INTRAVENOUS
Status: COMPLETED | OUTPATIENT
Start: 2023-12-26 | End: 2023-12-26

## 2023-12-26 RX ORDER — TRAMADOL HYDROCHLORIDE 50 MG/1
50 TABLET ORAL EVERY 6 HOURS PRN
Qty: 12 TABLET | Refills: 0 | Status: SHIPPED | OUTPATIENT
Start: 2023-12-26

## 2023-12-26 RX ADMIN — KETOROLAC TROMETHAMINE 30 MG: 30 INJECTION, SOLUTION INTRAMUSCULAR; INTRAVENOUS at 04:12

## 2023-12-26 NOTE — ED PROVIDER NOTES
Encounter Date: 2023       History     Chief Complaint   Patient presents with    Abscess     Pt stated that for the past couple days has been experiencing rectal pain and suspects that perirectal abscess has returned that has required surgical interventions twice before. Denied fever / nausea / vomiting.      Presents with pain between buttocks for a couple days similar to previous cases when she had to be taken to surgery. No fever or drainage. Called surgeon's office and was instructed to come to ER.     The history is provided by the patient.     Review of patient's allergies indicates:  No Known Allergies  Past Medical History:   Diagnosis Date     section wound complication      Past Surgical History:   Procedure Laterality Date    EXAMINATION UNDER ANESTHESIA N/A 10/7/2022    Procedure: Exam under anesthesia with drainage of perirectal abscess;  Surgeon: Farideh Davenport MD;  Location: [facility] OR;  Service: General;  Laterality: N/A;    HYSTERECTOMY      INCISION AND DRAINAGE OF PERIRECTAL REGION Left 2021    Procedure: INCISION AND DRAINAGE, PERIRECTAL REGION;  Surgeon: Farideh Davenport MD;  Location: [facility] OR;  Service: General;  Laterality: Left;    LAPAROSCOPIC APPENDECTOMY N/A 2022    Procedure: APPENDECTOMY, LAPAROSCOPIC;  Surgeon: Farideh Davenport MD;  Location: [facility] OR;  Service: General;  Laterality: N/A;    REPAIR, HERNIA, UMBILICAL  2022    Procedure: REPAIR, HERNIA, UMBILICAL;  Surgeon: Farideh Davenport MD;  Location: [facility] OR;  Service: General;;     Family History   Problem Relation Age of Onset    Diabetes Father     Hypertension Father      Social History     Tobacco Use    Smoking status: Never   Substance Use Topics    Alcohol use: Never    Drug use: Never     Review of Systems   Skin:  Positive for wound.   All other systems reviewed and are negative.      Physical Exam     Initial Vitals [23 1617]   BP Pulse Resp Temp SpO2   126/85 86 18 98.6 °F (37  °C) 99 %      MAP       --         Physical Exam    Nursing note and vitals reviewed.  Constitutional: Vital signs are normal. She appears well-developed and well-nourished. She is Obese . She is cooperative.   HENT:   Head: Normocephalic and atraumatic.   Neck: Neck supple.   Normal range of motion.  Cardiovascular:  Normal rate.           Musculoskeletal:         General: Normal range of motion.      Cervical back: Normal range of motion and neck supple.     Neurological: She is alert and oriented to person, place, and time.   Skin: Skin is warm, dry and intact. There is erythema.        Small area to left inner buttock of pain and tenderness. No area of fluctuance noted or drainable abscess    Psychiatric: She has a normal mood and affect. Her behavior is normal. Judgment and thought content normal.         ED Course   Procedures  Labs Reviewed - No data to display       Imaging Results    None          Medications   ketorolac injection 30 mg (30 mg Intramuscular Given 12/26/23 1642)     Medical Decision Making  Pain between buttocks for 2 days     Amount and/or Complexity of Data Reviewed  Discussion of management or test interpretation with external provider(s): Discussed no drainable abscess noted. With area of pain similar to previous will place on Clindamycin. Needs to monitor and if not improved schedule appointment with surgeon. If has fever or new or worsening problems needs to return to ER. Voiced understanding.     Risk  Prescription drug management.                                      Clinical Impression:  Final diagnoses:  [L02.91] Abscess (Primary)          ED Disposition Condition    Discharge Stable          ED Prescriptions       Medication Sig Dispense Start Date End Date Auth. Provider    clindamycin (CLEOCIN) 300 MG capsule Take 1 capsule (300 mg total) by mouth every 8 (eight) hours. for 10 days 30 capsule 12/26/2023 1/5/2024 Kit Mas, NP    traMADoL (ULTRAM) 50 mg tablet Take 1 tablet  (50 mg total) by mouth every 6 (six) hours as needed for Pain. 12 tablet 12/26/2023 -- Kit Mas NP          Follow-up Information       Follow up With Specialties Details Why Contact Info    Farideh Davenport MD General Surgery Schedule an appointment as soon as possible for a visit in 3 days As needed, If symptoms worsen [address] [place] [id number]  [phone]               Kit Mas NP  12/26/23 2619

## 2023-12-26 NOTE — Clinical Note
"Chuyita Matthews" Alcira was seen and treated in our emergency department on 12/26/2023.  She may return to work on 01/03/2024.       If you have any questions or concerns, please don't hesitate to call.      Kit Mas, NP"

## 2023-12-27 ENCOUNTER — TELEPHONE (OUTPATIENT)
Dept: ADMINISTRATIVE | Facility: CLINIC | Age: 28
End: 2023-12-27
Payer: MEDICAID

## 2023-12-27 NOTE — PROGRESS NOTES
Spoke with patient and she was unable to get sooner appointment with general surgery provider, which was reason for ER visit.  In-basket message sent to general surgery provider for patient call back to schedule follow up appointment.  Patient denied no other needs to be addressed at this time.

## 2023-12-28 ENCOUNTER — NURSE TRIAGE (OUTPATIENT)
Dept: ADMINISTRATIVE | Facility: CLINIC | Age: 28
End: 2023-12-28
Payer: MEDICAID

## 2023-12-28 ENCOUNTER — HOSPITAL ENCOUNTER (OUTPATIENT)
Facility: HOSPITAL | Age: 28
Discharge: HOME | End: 2023-12-29
Attending: STUDENT IN AN ORGANIZED HEALTH CARE EDUCATION/TRAINING PROGRAM | Admitting: INTERNAL MEDICINE
Payer: MEDICAID

## 2023-12-28 DIAGNOSIS — L03.317 CELLULITIS OF BUTTOCK: Primary | ICD-10-CM

## 2023-12-28 PROCEDURE — 99285 EMERGENCY DEPT VISIT HI MDM: CPT | Mod: 25

## 2023-12-28 PROCEDURE — 96361 HYDRATE IV INFUSION ADD-ON: CPT

## 2023-12-28 PROCEDURE — 96365 THER/PROPH/DIAG IV INF INIT: CPT | Mod: 59

## 2023-12-28 RX ORDER — CLINDAMYCIN PHOSPHATE 900 MG/50ML
900 INJECTION, SOLUTION INTRAVENOUS
Status: COMPLETED | OUTPATIENT
Start: 2023-12-28 | End: 2023-12-29

## 2023-12-28 NOTE — Clinical Note
Diagnosis: Cellulitis of buttock [[id number]]   Future Attending Provider: STEPHANIE MAXWELL III [[id number]]   Admitting Provider:: STEPHANIE MAXWELL III [[id number]]

## 2023-12-29 VITALS
BODY MASS INDEX: 42.28 KG/M2 | OXYGEN SATURATION: 99 % | RESPIRATION RATE: 14 BRPM | SYSTOLIC BLOOD PRESSURE: 105 MMHG | WEIGHT: 238.63 LBS | HEIGHT: 63 IN | HEART RATE: 87 BPM | TEMPERATURE: 98 F | DIASTOLIC BLOOD PRESSURE: 57 MMHG

## 2023-12-29 LAB
ALBUMIN SERPL BCP-MCNC: 3.4 G/DL (ref 3.5–5.2)
ALBUMIN SERPL BCP-MCNC: 3.8 G/DL (ref 3.5–5.2)
ALP SERPL-CCNC: 64 U/L (ref 55–135)
ALP SERPL-CCNC: 76 U/L (ref 55–135)
ALT SERPL W/O P-5'-P-CCNC: 15 U/L (ref 10–44)
ALT SERPL W/O P-5'-P-CCNC: 25 U/L (ref 10–44)
ANION GAP SERPL CALC-SCNC: 10 MMOL/L (ref 3–11)
ANION GAP SERPL CALC-SCNC: 5 MMOL/L (ref 3–11)
AST SERPL-CCNC: 11 U/L (ref 10–40)
AST SERPL-CCNC: 13 U/L (ref 10–40)
B-HCG UR QL: NEGATIVE
BASOPHILS # BLD AUTO: 0.04 K/UL (ref 0–0.2)
BASOPHILS # BLD AUTO: 0.05 K/UL (ref 0–0.2)
BASOPHILS NFR BLD: 0.3 % (ref 0–1.9)
BASOPHILS NFR BLD: 0.4 % (ref 0–1.9)
BILIRUB SERPL-MCNC: 0.5 MG/DL (ref 0.1–1)
BILIRUB SERPL-MCNC: 0.6 MG/DL (ref 0.1–1)
BUN SERPL-MCNC: 11 MG/DL (ref 6–20)
BUN SERPL-MCNC: 13 MG/DL (ref 6–20)
CALCIUM SERPL-MCNC: 9.2 MG/DL (ref 8.7–10.5)
CALCIUM SERPL-MCNC: 9.2 MG/DL (ref 8.7–10.5)
CHLORIDE SERPL-SCNC: 100 MMOL/L (ref 95–110)
CHLORIDE SERPL-SCNC: 106 MMOL/L (ref 95–110)
CO2 SERPL-SCNC: 27 MMOL/L (ref 23–29)
CO2 SERPL-SCNC: 28 MMOL/L (ref 23–29)
CREAT SERPL-MCNC: 0.8 MG/DL (ref 0.5–1.4)
CREAT SERPL-MCNC: 0.9 MG/DL (ref 0.5–1.4)
DIFFERENTIAL METHOD BLD: ABNORMAL
DIFFERENTIAL METHOD BLD: NORMAL
EOSINOPHIL # BLD AUTO: 0 K/UL (ref 0–0.5)
EOSINOPHIL # BLD AUTO: 0 K/UL (ref 0–0.5)
EOSINOPHIL NFR BLD: 0.1 % (ref 0–8)
EOSINOPHIL NFR BLD: 0.4 % (ref 0–8)
ERYTHROCYTE [DISTWIDTH] IN BLOOD BY AUTOMATED COUNT: 13.1 % (ref 11.5–14.5)
ERYTHROCYTE [DISTWIDTH] IN BLOOD BY AUTOMATED COUNT: 13.2 % (ref 11.5–14.5)
EST. GFR  (NO RACE VARIABLE): >60 ML/MIN/1.73 M^2
EST. GFR  (NO RACE VARIABLE): >60 ML/MIN/1.73 M^2
GLUCOSE SERPL-MCNC: 100 MG/DL (ref 70–110)
GLUCOSE SERPL-MCNC: 94 MG/DL (ref 70–110)
HCT VFR BLD AUTO: 41 % (ref 37–48.5)
HCT VFR BLD AUTO: 46.2 % (ref 37–48.5)
HGB BLD-MCNC: 13.2 G/DL (ref 12–16)
HGB BLD-MCNC: 14.7 G/DL (ref 12–16)
IMM GRANULOCYTES # BLD AUTO: 0.04 K/UL (ref 0–0.04)
IMM GRANULOCYTES # BLD AUTO: 0.04 K/UL (ref 0–0.04)
IMM GRANULOCYTES NFR BLD AUTO: 0.3 % (ref 0–0.5)
IMM GRANULOCYTES NFR BLD AUTO: 0.4 % (ref 0–0.5)
LACTATE SERPL-SCNC: 0.7 MMOL/L (ref 0.5–2.2)
LYMPHOCYTES # BLD AUTO: 2.1 K/UL (ref 1–4.8)
LYMPHOCYTES # BLD AUTO: 2.3 K/UL (ref 1–4.8)
LYMPHOCYTES NFR BLD: 14.7 % (ref 18–48)
LYMPHOCYTES NFR BLD: 22.9 % (ref 18–48)
MCH RBC QN AUTO: 27.3 PG (ref 27–31)
MCH RBC QN AUTO: 27.6 PG (ref 27–31)
MCHC RBC AUTO-ENTMCNC: 31.8 G/DL (ref 32–36)
MCHC RBC AUTO-ENTMCNC: 32.2 G/DL (ref 32–36)
MCV RBC AUTO: 86 FL (ref 82–98)
MCV RBC AUTO: 86 FL (ref 82–98)
MONOCYTES # BLD AUTO: 0.7 K/UL (ref 0.3–1)
MONOCYTES # BLD AUTO: 0.7 K/UL (ref 0.3–1)
MONOCYTES NFR BLD: 5 % (ref 4–15)
MONOCYTES NFR BLD: 6.8 % (ref 4–15)
NEUTROPHILS # BLD AUTO: 11.5 K/UL (ref 1.8–7.7)
NEUTROPHILS # BLD AUTO: 7.1 K/UL (ref 1.8–7.7)
NEUTROPHILS NFR BLD: 69.1 % (ref 38–73)
NEUTROPHILS NFR BLD: 79.6 % (ref 38–73)
NRBC BLD-RTO: 0 /100 WBC
NRBC BLD-RTO: 0 /100 WBC
PLATELET # BLD AUTO: 305 K/UL (ref 150–450)
PLATELET # BLD AUTO: 328 K/UL (ref 150–450)
PMV BLD AUTO: 10 FL (ref 9.2–12.9)
PMV BLD AUTO: 10.1 FL (ref 9.2–12.9)
POTASSIUM SERPL-SCNC: 3.6 MMOL/L (ref 3.5–5.1)
POTASSIUM SERPL-SCNC: 3.7 MMOL/L (ref 3.5–5.1)
PROT SERPL-MCNC: 7.5 G/DL (ref 6–8.4)
PROT SERPL-MCNC: 8.4 G/DL (ref 6–8.4)
RBC # BLD AUTO: 4.79 M/UL (ref 4–5.4)
RBC # BLD AUTO: 5.39 M/UL (ref 4–5.4)
SODIUM SERPL-SCNC: 138 MMOL/L (ref 136–145)
SODIUM SERPL-SCNC: 138 MMOL/L (ref 136–145)
WBC # BLD AUTO: 10.23 K/UL (ref 3.9–12.7)
WBC # BLD AUTO: 14.47 K/UL (ref 3.9–12.7)

## 2023-12-29 PROCEDURE — 83605 ASSAY OF LACTIC ACID: CPT | Performed by: STUDENT IN AN ORGANIZED HEALTH CARE EDUCATION/TRAINING PROGRAM

## 2023-12-29 PROCEDURE — G0378 HOSPITAL OBSERVATION PER HR: HCPCS

## 2023-12-29 PROCEDURE — 25500020 PHARM REV CODE 255: Performed by: STUDENT IN AN ORGANIZED HEALTH CARE EDUCATION/TRAINING PROGRAM

## 2023-12-29 PROCEDURE — 80053 COMPREHEN METABOLIC PANEL: CPT | Mod: 91 | Performed by: STUDENT IN AN ORGANIZED HEALTH CARE EDUCATION/TRAINING PROGRAM

## 2023-12-29 PROCEDURE — 25000003 PHARM REV CODE 250: Performed by: STUDENT IN AN ORGANIZED HEALTH CARE EDUCATION/TRAINING PROGRAM

## 2023-12-29 PROCEDURE — 81025 URINE PREGNANCY TEST: CPT | Performed by: STUDENT IN AN ORGANIZED HEALTH CARE EDUCATION/TRAINING PROGRAM

## 2023-12-29 PROCEDURE — 63600175 PHARM REV CODE 636 W HCPCS: Performed by: STUDENT IN AN ORGANIZED HEALTH CARE EDUCATION/TRAINING PROGRAM

## 2023-12-29 PROCEDURE — 87040 BLOOD CULTURE FOR BACTERIA: CPT | Performed by: STUDENT IN AN ORGANIZED HEALTH CARE EDUCATION/TRAINING PROGRAM

## 2023-12-29 PROCEDURE — 36415 COLL VENOUS BLD VENIPUNCTURE: CPT | Performed by: STUDENT IN AN ORGANIZED HEALTH CARE EDUCATION/TRAINING PROGRAM

## 2023-12-29 PROCEDURE — 80053 COMPREHEN METABOLIC PANEL: CPT | Performed by: STUDENT IN AN ORGANIZED HEALTH CARE EDUCATION/TRAINING PROGRAM

## 2023-12-29 PROCEDURE — 85025 COMPLETE CBC W/AUTO DIFF WBC: CPT | Performed by: STUDENT IN AN ORGANIZED HEALTH CARE EDUCATION/TRAINING PROGRAM

## 2023-12-29 RX ORDER — SODIUM CHLORIDE 0.9 % (FLUSH) 0.9 %
10 SYRINGE (ML) INJECTION
Status: DISCONTINUED | OUTPATIENT
Start: 2023-12-29 | End: 2023-12-29 | Stop reason: HOSPADM

## 2023-12-29 RX ORDER — TALC
6 POWDER (GRAM) TOPICAL NIGHTLY PRN
Status: DISCONTINUED | OUTPATIENT
Start: 2023-12-29 | End: 2023-12-29 | Stop reason: HOSPADM

## 2023-12-29 RX ORDER — CLINDAMYCIN PHOSPHATE 900 MG/50ML
900 INJECTION, SOLUTION INTRAVENOUS
Status: DISCONTINUED | OUTPATIENT
Start: 2023-12-29 | End: 2023-12-29 | Stop reason: HOSPADM

## 2023-12-29 RX ORDER — SODIUM CHLORIDE 9 MG/ML
INJECTION, SOLUTION INTRAVENOUS
Status: DISCONTINUED | OUTPATIENT
Start: 2023-12-29 | End: 2023-12-29 | Stop reason: HOSPADM

## 2023-12-29 RX ADMIN — CLINDAMYCIN PHOSPHATE 900 MG: 900 INJECTION, SOLUTION INTRAVENOUS at 01:12

## 2023-12-29 RX ADMIN — SODIUM CHLORIDE: 9 INJECTION, SOLUTION INTRAVENOUS at 01:12

## 2023-12-29 RX ADMIN — IOHEXOL 100 ML: 350 INJECTION, SOLUTION INTRAVENOUS at 12:12

## 2023-12-29 NOTE — ASSESSMENT & PLAN NOTE
Patient afebrile without leukocytosis.  Area now draining.  Unable to visualize abscess on physical exam.  No erythema, induration noted.   We will transition to p.o. antibiotics.  Continue p.r.n. tramadol as needed.

## 2023-12-29 NOTE — HOSPITAL COURSE
12/29/23 DL Discharge Summary:    Patient doing well this morning.  She is afebrile and without leukocytosis.  Vital signs stable.  Stable for discharge home on continued p.o. antibiotics.  Patient advised to follow up with PCP in 1 week.  Strict return precautions given.

## 2023-12-29 NOTE — TELEPHONE ENCOUNTER
Chuyita states she went to ED for rectal abscess on 12/26/23. Currently being treated with Clindamycin and tramadol. Pt reports new onset of fever today with temp of 100.6 at 1800. Pt states rectal abscess popped suddenly within the last hour. Drainage was creamy tan color but now bloody drainage. Pt states rectal abscess is internal and drainage is coming out of vagina. .Advised pt per triage protocol to see a physician within next 4 hours at nearest UC/ED. V/u.  Reason for Disposition   [1] Treatment (antibiotic, I&D, or moist heat) > 24 hours AND [2] new-onset fever    Additional Information   Negative: [1] Widespread rash AND [2] bright red, sunburn-like AND [3] too weak to stand   Negative: Sounds like a life-threatening emergency to the triager   Negative: [1] Widespread rash AND [2] bright red, sunburn-like AND [3] too weak to stand   Negative: Sounds like a life-threatening emergency to the triager   Negative: [1] Widespread rash AND [2] bright red, sunburn-like   Negative: Fever > 103 F (39.4 C)   Negative: [1] Spreading redness on face AND [2] fever   Negative: Black (necrotic), dark purple, or blisters develop in area of boil (abscess)   Negative: Patient sounds very sick or weak to the triager   Negative: SEVERE pain (e.g., excruciating)   Negative: [1] Treatment (antibiotic, I&D, or moist heat) > 24 hours AND [2] new-onset red streak (or line) runs from area of infection    Protocols used: Boil (Skin Abscess)-A-, Boil (Skin Abscess) on Treatment Follow-up Call-A-

## 2023-12-29 NOTE — PLAN OF CARE
[facility] - Emergency Department  Initial Discharge Assessment       Primary Care Provider: Tucker Gutierrez NP    Admission Diagnosis: Cellulitis of buttock [L03.317]    Admission Date: 12/28/2023  Expected Discharge Date: 12/29/2023         Payor: MEDICAID / Plan: [facility] CONNECT / Product Type: Managed Medicaid /     Extended Emergency Contact Information  Primary Emergency Contact: CRISTÓBAL JURADO  Mobile Phone: [phone]  Relation: Sister  Preferred language: English   needed? No    Discharge Plan A: Home  Discharge Plan B: Home      [facility] - [place], [place] - [address]  [address]  [address]  [place] [address]  Phone: [phone] Fax: [phone]    [facility] - [place], [place] - [address]  [address]  [facility] [address]  Phone: [phone] Fax: [phone]      Initial Assessment (most recent)       Adult Discharge Assessment - 12/29/23 0914          Discharge Assessment    Assessment Type Discharge Planning Assessment     Source of Information health record     Discharge Plan A Home     Discharge Plan B Home                        Patient found medically stable for discharge home.

## 2023-12-29 NOTE — SUBJECTIVE & OBJECTIVE
Past Medical History:   Diagnosis Date     section wound complication        Past Surgical History:   Procedure Laterality Date    EXAMINATION UNDER ANESTHESIA N/A 10/7/2022    Procedure: Exam under anesthesia with drainage of perirectal abscess;  Surgeon: Farideh Davenport MD;  Location: [facility] OR;  Service: General;  Laterality: N/A;    HYSTERECTOMY      INCISION AND DRAINAGE OF PERIRECTAL REGION Left 2021    Procedure: INCISION AND DRAINAGE, PERIRECTAL REGION;  Surgeon: Farideh Davenport MD;  Location: [facility] OR;  Service: General;  Laterality: Left;    LAPAROSCOPIC APPENDECTOMY N/A 2022    Procedure: APPENDECTOMY, LAPAROSCOPIC;  Surgeon: Farideh Davenport MD;  Location: [facility] OR;  Service: General;  Laterality: N/A;    REPAIR, HERNIA, UMBILICAL  2022    Procedure: REPAIR, HERNIA, UMBILICAL;  Surgeon: Farideh Davenport MD;  Location: [facility] OR;  Service: General;;       Review of patient's allergies indicates:  No Known Allergies    No current facility-administered medications on file prior to encounter.     Current Outpatient Medications on File Prior to Encounter   Medication Sig    clindamycin (CLEOCIN) 300 MG capsule Take 1 capsule (300 mg total) by mouth every 8 (eight) hours. for 10 days    magnesium oxide (MAG-OX) 400 mg (241.3 mg magnesium) tablet Take 1 tablet (400 mg total) by mouth once daily.    potassium chloride SA (K-DUR,KLOR-CON) 20 MEQ tablet Take 1 tablet (20 mEq total) by mouth once daily.    psyllium husk, with sugar, 3.4 gram packet Take 1 packet by mouth 2 (two) times daily.    traMADoL (ULTRAM) 50 mg tablet Take 1 tablet (50 mg total) by mouth every 6 (six) hours as needed for Pain.     Family History       Problem Relation (Age of Onset)    Diabetes Father    Hypertension Father          Tobacco Use    Smoking status: Never    Smokeless tobacco: Not on file   Substance and Sexual Activity    Alcohol use: Never    Drug use: Never    Sexual activity: Yes     Review of  Systems   Constitutional:  Negative for activity change, appetite change, chills, diaphoresis, fatigue and fever.   HENT:  Negative for congestion, ear pain, postnasal drip, rhinorrhea, sinus pressure, sinus pain, sore throat and trouble swallowing.    Eyes:  Negative for photophobia, pain and visual disturbance.   Respiratory:  Negative for cough, chest tightness, shortness of breath and wheezing.    Cardiovascular:  Negative for chest pain, palpitations and leg swelling.   Gastrointestinal:  Negative for abdominal distention, abdominal pain, blood in stool, constipation, diarrhea, nausea and vomiting.   Endocrine: Negative for polydipsia, polyphagia and polyuria.   Genitourinary:  Negative for decreased urine volume, difficulty urinating, dysuria, flank pain, frequency, hematuria and urgency.   Musculoskeletal:  Negative for arthralgias and myalgias.   Skin:  Positive for wound. Negative for color change and rash.        Positive for draining abscess to perirectal area.   Allergic/Immunologic: Negative.    Neurological:  Negative for dizziness, seizures, syncope, speech difficulty, weakness, light-headedness and headaches.   Hematological: Negative.    Psychiatric/Behavioral:  Negative for agitation, confusion, sleep disturbance and suicidal ideas. The patient is not nervous/anxious.      Objective:     Vital Signs (Most Recent):  Temp: 98.2 °F (36.8 °C) (12/29/23 0319)  Pulse: 96 (12/29/23 0746)  Resp: 18 (12/29/23 0746)  BP: 118/65 (12/29/23 0746)  SpO2: 99 % (12/29/23 0746) Vital Signs (24h Range):  Temp:  [98.2 °F (36.8 °C)-98.7 °F (37.1 °C)] 98.2 °F (36.8 °C)  Pulse:  [] 96  Resp:  [18] 18  SpO2:  [99 %] 99 %  BP: (109-118)/(65-85) 118/65     Weight: 108.2 kg (238 lb 9.6 oz)  Body mass index is 42.27 kg/m².     Physical Exam  Vitals and nursing note reviewed.   Constitutional:       General: She is not in acute distress.     Appearance: Normal appearance. She is not ill-appearing.   HENT:      Head:  Normocephalic and atraumatic.      Nose: Nose normal. No congestion or rhinorrhea.      Mouth/Throat:      Mouth: Mucous membranes are moist.      Pharynx: Oropharynx is clear. No oropharyngeal exudate or posterior oropharyngeal erythema.   Eyes:      General: No scleral icterus.     Extraocular Movements: Extraocular movements intact.      Conjunctiva/sclera: Conjunctivae normal.      Pupils: Pupils are equal, round, and reactive to light.   Cardiovascular:      Rate and Rhythm: Normal rate and regular rhythm.      Pulses: Normal pulses.      Heart sounds: Normal heart sounds. No murmur heard.  Pulmonary:      Effort: Pulmonary effort is normal. No respiratory distress.      Breath sounds: Normal breath sounds. No wheezing, rhonchi or rales.   Abdominal:      General: Bowel sounds are normal. There is no distension.      Palpations: Abdomen is soft.      Tenderness: There is no abdominal tenderness. There is no guarding or rebound.   Musculoskeletal:         General: No swelling or tenderness. Normal range of motion.      Cervical back: Normal range of motion and neck supple. No tenderness.      Right lower leg: No edema.      Left lower leg: No edema.   Lymphadenopathy:      Cervical: No cervical adenopathy.   Skin:     General: Skin is warm and dry.      Findings: No erythema.   Neurological:      General: No focal deficit present.      Mental Status: She is alert and oriented to person, place, and time.      Cranial Nerves: No cranial nerve deficit.      Motor: No weakness.      Coordination: Coordination normal.      Gait: Gait normal.   Psychiatric:         Mood and Affect: Mood normal.         Behavior: Behavior normal.         Thought Content: Thought content normal.         Judgment: Judgment normal.              CRANIAL NERVES     CN III, IV, VI   Pupils are equal, round, and reactive to light.       Significant Labs: All pertinent labs within the past 24 hours have been reviewed.  Recent Lab Results          12/29/23  0609   12/29/23  0002        Albumin 3.4   3.8       ALP 64   76       ALT 15   25       Anion Gap 5   10       AST 11   13       Baso # 0.04   0.05       Basophil % 0.4   0.3       BILIRUBIN TOTAL 0.5  Comment: For infants and newborns, interpretation of results should be based  on gestational age, weight and in agreement with clinical  observations.    Premature Infant recommended reference ranges:  Up to 24 hours.............<8.0 mg/dL  Up to 48 hours............<12.0 mg/dL  3-5 days..................<15.0 mg/dL  6-29 days.................<15.0 mg/dL    For patients on Eltrombopag therapy, use of Dimension [place] TBIL is   not   recommended.     0.6  Comment: For infants and newborns, interpretation of results should be based  on gestational age, weight and in agreement with clinical  observations.    Premature Infant recommended reference ranges:  Up to 24 hours.............<8.0 mg/dL  Up to 48 hours............<12.0 mg/dL  3-5 days..................<15.0 mg/dL  6-29 days.................<15.0 mg/dL    For patients on Eltrombopag therapy, use of Dimension [place] TBIL is   not   recommended.         BUN 13   11       Calcium 9.2   9.2       Chloride 106   100       CO2 27   28       Creatinine 0.8   0.9       Differential Method Automated   Automated       eGFR >60.0   >60.0       Eos # 0.0   0.0       Eosinophil % 0.4   0.1       Glucose 94   100       Gran # (ANC) 7.1   11.5       Gran % 69.1   79.6       Hematocrit 41.0   46.2       Hemoglobin 13.2   14.7       Immature Grans (Abs) 0.04  Comment: Mild elevation in immature granulocytes is non specific and   can be seen in a variety of conditions including stress response,   acute inflammation, trauma and pregnancy. Correlation with other   laboratory and clinical findings is essential.     0.04  Comment: Mild elevation in immature granulocytes is non specific and   can be seen in a variety of conditions including stress response,   acute  inflammation, trauma and pregnancy. Correlation with other   laboratory and clinical findings is essential.         Immature Granulocytes 0.4   0.3       Lactate, Charanjit   0.7       Lymph # 2.3   2.1       Lymph % 22.9   14.7       MCH 27.6   27.3       MCHC 32.2   31.8       MCV 86   86       Mono # 0.7   0.7       Mono % 6.8   5.0       MPV 10.1   10.0       nRBC 0   0       Platelet Count 305   328       Potassium 3.6   3.7       Preg Test, Ur   Negative       PROTEIN TOTAL 7.5   8.4       RBC 4.79   5.39       RDW 13.1   13.2       Sodium 138   138       WBC 10.23   14.47               Significant Imaging: I have reviewed all pertinent imaging results/findings within the past 24 hours.

## 2023-12-29 NOTE — HPI
ED HPI:    Chief Complaint   Patient presents with    Abscess       Patient seen Tuesday 12/26 for an abscess to rectum. Patient reports that the abscess popped with bloody/purulent drainage. Patient currently taking Clindamycin.          History of Present Illness  28 y.o. female who presents to the ED with swelling and redness to the gluteal cleft x 1 week.  Denies fever, chills, vomiting or diarrhea.    IM HPI:  Agree with above.  Patient She was seen had [facility] Tuesday for same complaint and started on p.o. clindamycin.  Patient states since that time area has began to drain.  She reports pain prior to onset of drainage but states pain is more controlled now.  CT abdomen and pelvis obtained in the emergency department last night shows evidence of draining abscess without evidence of involvement of musculature or tissues.  Patient tachycardic upon arrival and noted to be borderline hypotensive.  Patient states this is baseline blood pressure.  Heart rate in the 70s this morning.  Patient is afebrile without leukocytosis.  She is awake, alert, oriented.  She denies pain.

## 2023-12-29 NOTE — ASSESSMENT & PLAN NOTE
Patient afebrile without leukocytosis.  Area now draining.  Unable to visualize abscess on physical exam.  No erythema, induration noted.   We will transition to p.o. antibiotics.  Continue p.r.n. tramadol as needed.  Stable for discharge.

## 2023-12-29 NOTE — ED PROVIDER NOTES
"[facility]                                                  Chief Complaint   Patient presents with    Abscess     Patient seen  for an abscess to rectum. Patient reports that the abscess popped with bloody/purulent drainage. Patient currently taking Clindamycin.        History of Present Illness  28 y.o. female who presents to the ED with swelling and redness to the gluteal cleft x 1 week. She was recently diagnosed with an abscess and has been taking antibiotics, but her symptoms have not improved. Denies fever, chills, vomiting or diarrhea.    Review of patient's allergies indicates:  No Known Allergies  Past Medical History:   Diagnosis Date     section wound complication      Past Surgical History:   Procedure Laterality Date    EXAMINATION UNDER ANESTHESIA N/A 10/7/2022    Procedure: Exam under anesthesia with drainage of perirectal abscess;  Surgeon: Farideh Davenport MD;  Location: [facility] OR;  Service: General;  Laterality: N/A;    HYSTERECTOMY      INCISION AND DRAINAGE OF PERIRECTAL REGION Left 2021    Procedure: INCISION AND DRAINAGE, PERIRECTAL REGION;  Surgeon: Farideh Davenport MD;  Location: [facility] OR;  Service: General;  Laterality: Left;    LAPAROSCOPIC APPENDECTOMY N/A 2022    Procedure: APPENDECTOMY, LAPAROSCOPIC;  Surgeon: Farideh Davenport MD;  Location: [facility] OR;  Service: General;  Laterality: N/A;    REPAIR, HERNIA, UMBILICAL  2022    Procedure: REPAIR, HERNIA, UMBILICAL;  Surgeon: Farideh Davenport MD;  Location: [facility] OR;  Service: General;;      Family History   Problem Relation Age of Onset    Diabetes Father     Hypertension Father      Social History     Tobacco Use    Smoking status: Never   Substance Use Topics    Alcohol use: Never    Drug use: Never          Review of Systems and Physical Exam     Review of Systems    Pertinent positives and negatives listed in HPI    Vital Signs   height is 5' 3" (1.6 m) and weight is 108.2 " kg (238 lb 9.6 oz). Her temperature is 98.2 °F (36.8 °C). Her blood pressure is 105/57 (abnormal) and her pulse is 87. Her respiration is 14 and oxygen saturation is 99%.      Physical Exam  Nursing note and vitals reviewed  --Constitutional: In no acute distress.  --HENT: Normocephalic, atraumatic.  --Eyes: Normal conjunctiva.  --Neck: Normal range of motion.  --Chest/Cardiac: Normal rate, intact distal pulses.  --Pulmonary: Normal respiratory effort, breath sounds normal.  --Abdominal: Soft, with no tenderness.  --Musculoskeletal: Normal range of motion. No deformity.  --Neurological: Alert and oriented x 4. Normal tone.  --Skin: Erythema noted to the inferior anus.    Results (Studies ordered and reviewed by me)     Radiology    CT Abdomen Pelvis With IV Contrast NO Oral Contrast   Final Result      1. Some left lateral perirectal soft tissue thickening which could reflect phlegmon.   2. No discrete rim enhancing fluid collection identified to suggest abscess formation at this time.   3.  A preliminary report was faxed by [facility] shortly after completion of this examination.         Electronically signed by: Antoine Alexander MD   Date:    12/29/2023   Time:    11:24         Procedures     Critical Care    Date/Time: 12/29/2023 4:00 AM    Performed by: Antonio Quintanilla MD  Authorized by: Antonio Quintanilla MD  [facility] patient critical care time: 8 minutes  Additional history critical care time: 9 minutes  Ordering / reviewing critical care time: 10 minutes  Documentation critical care time: 9 minutes  Total critical care time (exclusive of procedural time) : 36 minutes  Critical care time was exclusive of separately billable procedures and treating other patients.  Critical care was necessary to treat or prevent imminent or life-threatening deterioration of the following conditions: IV antibiotics given for severe infection.  Critical care was time spent personally by me on the following activities:  "blood draw for specimens, development of treatment plan with patient or surrogate, discussions with consultants, evaluation of patient's response to treatment, examination of patient, obtaining history from patient or surrogate, ordering and performing treatments and interventions, ordering and review of laboratory studies, ordering and review of radiographic studies, pulse oximetry, re-evaluation of patient's condition and review of old charts.        Medical Decision Making and ED Course     History Source(s):  Patient    Social Determinants of Health  This visit with significantly impacted by assessment of access to primary care.     height is 5' 3" (1.6 m) and weight is 108.2 kg (238 lb 9.6 oz). Her temperature is 98.2 °F (36.8 °C). Her blood pressure is 105/57 (abnormal) and her pulse is 87. Her respiration is 14 and oxygen saturation is 99%.     MDM 28 y.o. female who presents to the ED with swelling and redness to the gluteal cleft x 1 week. Physical exam findings noted above. Initial ddx include but is not limited to: Cellulitis, Lymphangitis, Abscess, Erysipelas.    Lab/Study Interpretation: Normal WBC/normal H-H/normal platelets on CBC, normal electrolytes/normal LFTs on CMP, normal Lactic Acid. Findings concerning for collapsed perianal abscess or infectious phlegmon noted on CT-Abdominopelvic.    Presentation consistent with perianal cellulitis. Treatment provided with Clindamycin.    Patient was admitted to Hospital Medicine for further care.    Diagnosis  The encounter diagnosis was Cellulitis of buttock.    Disposition and Plan  Condition: Stable  Disposition: Admission         Antonio Quintanilla MD  12/31/23 1817    "

## 2024-01-03 LAB
BACTERIA BLD CULT: NORMAL
BACTERIA BLD CULT: NORMAL

## 2024-01-27 NOTE — DISCHARGE SUMMARY
[facility] Emergency Department  [facility]  Discharge Summary      Patient Name: Chuyita Eli  MRN: [id number]  KEE: [id number]  Patient Class: OP- Observation  Admission Date: 12/28/2023  Hospital Length of Stay: 0 days  Discharge Date and Time: No discharge date for patient encounter.  Attending Physician: Tr Real III, MD   Discharging Provider: Annika Vivar NP  Primary Care Provider: Tucker Gutierrez NP    Primary Care Team: Networked reference to record PCT     HPI:   ED HPI:    Chief Complaint   Patient presents with    Abscess       Patient seen Tuesday 12/26 for an abscess to rectum. Patient reports that the abscess popped with bloody/purulent drainage. Patient currently taking Clindamycin.          History of Present Illness  28 y.o. female who presents to the ED with swelling and redness to the gluteal cleft x 1 week.  Denies fever, chills, vomiting or diarrhea.    IM HPI:  Agree with above.  Patient She was seen had [facility] Tuesday for same complaint and started on p.o. clindamycin.  Patient states since that time area has began to drain.  She reports pain prior to onset of drainage but states pain is more controlled now.  CT abdomen and pelvis obtained in the emergency department last night shows evidence of draining abscess without evidence of involvement of musculature or tissues.  Patient tachycardic upon arrival and noted to be borderline hypotensive.  Patient states this is baseline blood pressure.  Heart rate in the 70s this morning.  Patient is afebrile without leukocytosis.  She is awake, alert, oriented.  She denies pain.    * No surgery found *      Hospital Course:   12/29/23 DL Discharge Summary:    Patient doing well this morning.  She is afebrile and without leukocytosis.  Vital signs stable.  Stable for discharge home on continued p.o. antibiotics.  Patient advised to follow up with PCP in 1 week.  Strict return precautions  given.     Goals of Care Treatment Preferences:  Code Status: Full Code      Consults:     GI  * Perirectal abscess  Patient afebrile without leukocytosis.  Area now draining.  Unable to visualize abscess on physical exam.  No erythema, induration noted.   We will transition to p.o. antibiotics.  Continue p.r.n. tramadol as needed.  Stable for discharge.        Final Active Diagnoses:    Diagnosis Date Noted POA    PRINCIPAL PROBLEM:  Perirectal abscess [K61.1] 11/09/2021 Yes      Problems Resolved During this Admission:       Discharged Condition: stable    Disposition: Home or Self Care    Follow Up:   Follow-up Information       Tucker Gutierrez, NP. Schedule an appointment as soon as possible for a visit in 1 week(s).    Specialty: Emergency Medicine  Contact information:  [address] [place] [address] [id number] [phone]                           Patient Instructions:      Diet Adult Regular     Activity as tolerated       Significant Diagnostic Studies: Labs: All labs within the past 24 hours have been reviewed    Pending Diagnostic Studies:       Procedure Component Value Units Date/Time    CT Abdomen Pelvis With IV Contrast NO Oral Contrast [[id number]] Resulted: 12/29/23 0016    Order Status: Sent Lab Status: In process Updated: 12/29/23 0039           Medications:  Reconciled Home Medications:      Medication List        CONTINUE taking these medications      clindamycin 300 MG capsule  Commonly known as: CLEOCIN  Take 1 capsule (300 mg total) by mouth every 8 (eight) hours. for 10 days     traMADoL 50 mg tablet  Commonly known as: ULTRAM  Take 1 tablet (50 mg total) by mouth every 6 (six) hours as needed for Pain.            STOP taking these medications      magnesium oxide 400 mg (241.3 mg magnesium) tablet  Commonly known as: MAG-OX     potassium chloride SA 20 MEQ tablet  Commonly known as: K-DUR,KLOR-CON     psyllium husk (with sugar) 3.4 gram packet              Indwelling Lines/Drains at  time of discharge:   Lines/Drains/Airways       None                   Time spent on the discharge of patient: 31 minutes         Annika Vivar NP  Department of Hospital Medicine  [facility] - Emergency Department   negative

## 2024-01-29 DIAGNOSIS — K61.2 ANORECTAL ABSCESS: Primary | ICD-10-CM

## 2024-07-09 ENCOUNTER — HOSPITAL ENCOUNTER (EMERGENCY)
Facility: HOSPITAL | Age: 29
Discharge: HOME | End: 2024-07-09
Attending: STUDENT IN AN ORGANIZED HEALTH CARE EDUCATION/TRAINING PROGRAM
Payer: MEDICAID

## 2024-07-09 VITALS
HEART RATE: 72 BPM | DIASTOLIC BLOOD PRESSURE: 74 MMHG | HEIGHT: 63 IN | OXYGEN SATURATION: 98 % | SYSTOLIC BLOOD PRESSURE: 130 MMHG | BODY MASS INDEX: 41.64 KG/M2 | WEIGHT: 235 LBS | RESPIRATION RATE: 18 BRPM | TEMPERATURE: 98 F

## 2024-07-09 DIAGNOSIS — N20.0 NEPHROLITHIASIS: Primary | ICD-10-CM

## 2024-07-09 LAB
ALBUMIN SERPL BCP-MCNC: 3.7 G/DL (ref 3.5–5.2)
ALP SERPL-CCNC: 54 U/L (ref 55–135)
ALT SERPL W/O P-5'-P-CCNC: 22 U/L (ref 10–44)
ANION GAP SERPL CALC-SCNC: 7 MMOL/L (ref 3–11)
AST SERPL-CCNC: 12 U/L (ref 10–40)
B-HCG UR QL: NEGATIVE
BACTERIA #/AREA URNS HPF: NEGATIVE /HPF
BASOPHILS # BLD AUTO: 0.03 K/UL (ref 0–0.2)
BASOPHILS NFR BLD: 0.5 % (ref 0–1.9)
BILIRUB SERPL-MCNC: 0.2 MG/DL (ref 0.1–1)
BILIRUB UR QL STRIP: NEGATIVE
BUN SERPL-MCNC: 15 MG/DL (ref 6–20)
CALCIUM SERPL-MCNC: 9.1 MG/DL (ref 8.7–10.5)
CHLORIDE SERPL-SCNC: 105 MMOL/L (ref 95–110)
CLARITY UR: CLEAR
CO2 SERPL-SCNC: 30 MMOL/L (ref 23–29)
COLOR UR: YELLOW
CREAT SERPL-MCNC: 0.9 MG/DL (ref 0.5–1.4)
DIFFERENTIAL METHOD BLD: NORMAL
EOSINOPHIL # BLD AUTO: 0.1 K/UL (ref 0–0.5)
EOSINOPHIL NFR BLD: 1.8 % (ref 0–8)
ERYTHROCYTE [DISTWIDTH] IN BLOOD BY AUTOMATED COUNT: 13.2 % (ref 11.5–14.5)
EST. GFR  (NO RACE VARIABLE): >60 ML/MIN/1.73 M^2
GLUCOSE SERPL-MCNC: 106 MG/DL (ref 70–110)
GLUCOSE UR QL STRIP: NEGATIVE
HCT VFR BLD AUTO: 37.9 % (ref 37–48.5)
HGB BLD-MCNC: 12.6 G/DL (ref 12–16)
HGB UR QL STRIP: NEGATIVE
HYALINE CASTS #/AREA URNS LPF: 0.7 /LPF
IMM GRANULOCYTES # BLD AUTO: 0.02 K/UL (ref 0–0.04)
IMM GRANULOCYTES NFR BLD AUTO: 0.3 % (ref 0–0.5)
KETONES UR QL STRIP: NEGATIVE
LEUKOCYTE ESTERASE UR QL STRIP: ABNORMAL
LIPASE SERPL-CCNC: 38 U/L (ref 13–75)
LYMPHOCYTES # BLD AUTO: 1.7 K/UL (ref 1–4.8)
LYMPHOCYTES NFR BLD: 28.5 % (ref 18–48)
MCH RBC QN AUTO: 28.3 PG (ref 27–31)
MCHC RBC AUTO-ENTMCNC: 33.2 G/DL (ref 32–36)
MCV RBC AUTO: 85 FL (ref 82–98)
MICROSCOPIC COMMENT: ABNORMAL
MONOCYTES # BLD AUTO: 0.5 K/UL (ref 0.3–1)
MONOCYTES NFR BLD: 8.9 % (ref 4–15)
NEUTROPHILS # BLD AUTO: 3.6 K/UL (ref 1.8–7.7)
NEUTROPHILS NFR BLD: 60 % (ref 38–73)
NITRITE UR QL STRIP: NEGATIVE
NRBC BLD-RTO: 0 /100 WBC
PH UR STRIP: 7 [PH] (ref 5–8)
PLATELET # BLD AUTO: 269 K/UL (ref 150–450)
PMV BLD AUTO: 10.1 FL (ref 9.2–12.9)
POTASSIUM SERPL-SCNC: 3.7 MMOL/L (ref 3.5–5.1)
PROT SERPL-MCNC: 7.1 G/DL (ref 6–8.4)
PROT UR QL STRIP: NEGATIVE
RBC # BLD AUTO: 4.46 M/UL (ref 4–5.4)
RBC #/AREA URNS HPF: 1 /HPF (ref 0–4)
SODIUM SERPL-SCNC: 142 MMOL/L (ref 136–145)
SP GR UR STRIP: 1.01 (ref 1–1.03)
SQUAMOUS #/AREA URNS HPF: 0 /HPF
URN SPEC COLLECT METH UR: ABNORMAL
UROBILINOGEN UR STRIP-ACNC: NEGATIVE EU/DL
WBC # BLD AUTO: 5.97 K/UL (ref 3.9–12.7)
WBC #/AREA URNS HPF: 24 /HPF (ref 0–5)

## 2024-07-09 PROCEDURE — 80053 COMPREHEN METABOLIC PANEL: CPT | Performed by: STUDENT IN AN ORGANIZED HEALTH CARE EDUCATION/TRAINING PROGRAM

## 2024-07-09 PROCEDURE — 96361 HYDRATE IV INFUSION ADD-ON: CPT

## 2024-07-09 PROCEDURE — 85025 COMPLETE CBC W/AUTO DIFF WBC: CPT | Performed by: STUDENT IN AN ORGANIZED HEALTH CARE EDUCATION/TRAINING PROGRAM

## 2024-07-09 PROCEDURE — 25000003 PHARM REV CODE 250: Performed by: STUDENT IN AN ORGANIZED HEALTH CARE EDUCATION/TRAINING PROGRAM

## 2024-07-09 PROCEDURE — 87086 URINE CULTURE/COLONY COUNT: CPT | Performed by: STUDENT IN AN ORGANIZED HEALTH CARE EDUCATION/TRAINING PROGRAM

## 2024-07-09 PROCEDURE — 81000 URINALYSIS NONAUTO W/SCOPE: CPT | Performed by: STUDENT IN AN ORGANIZED HEALTH CARE EDUCATION/TRAINING PROGRAM

## 2024-07-09 PROCEDURE — 36415 COLL VENOUS BLD VENIPUNCTURE: CPT | Performed by: STUDENT IN AN ORGANIZED HEALTH CARE EDUCATION/TRAINING PROGRAM

## 2024-07-09 PROCEDURE — 99285 EMERGENCY DEPT VISIT HI MDM: CPT | Mod: 25

## 2024-07-09 PROCEDURE — 81025 URINE PREGNANCY TEST: CPT | Performed by: STUDENT IN AN ORGANIZED HEALTH CARE EDUCATION/TRAINING PROGRAM

## 2024-07-09 PROCEDURE — 83690 ASSAY OF LIPASE: CPT | Performed by: STUDENT IN AN ORGANIZED HEALTH CARE EDUCATION/TRAINING PROGRAM

## 2024-07-09 PROCEDURE — 63600175 PHARM REV CODE 636 W HCPCS: Performed by: STUDENT IN AN ORGANIZED HEALTH CARE EDUCATION/TRAINING PROGRAM

## 2024-07-09 PROCEDURE — 96374 THER/PROPH/DIAG INJ IV PUSH: CPT

## 2024-07-09 RX ORDER — OXYCODONE AND ACETAMINOPHEN 5; 325 MG/1; MG/1
1 TABLET ORAL EVERY 8 HOURS PRN
Qty: 15 TABLET | Refills: 0 | Status: SHIPPED | OUTPATIENT
Start: 2024-07-09 | End: 2024-07-14

## 2024-07-09 RX ORDER — KETOROLAC TROMETHAMINE 30 MG/ML
30 INJECTION, SOLUTION INTRAMUSCULAR; INTRAVENOUS
Status: COMPLETED | OUTPATIENT
Start: 2024-07-09 | End: 2024-07-09

## 2024-07-09 RX ORDER — TAMSULOSIN HYDROCHLORIDE 0.4 MG/1
0.4 CAPSULE ORAL DAILY
Qty: 10 CAPSULE | Refills: 0 | Status: SHIPPED | OUTPATIENT
Start: 2024-07-09 | End: 2025-07-09

## 2024-07-09 RX ORDER — KETOROLAC TROMETHAMINE 10 MG/1
10 TABLET, FILM COATED ORAL EVERY 6 HOURS
Qty: 20 TABLET | Refills: 0 | Status: SHIPPED | OUTPATIENT
Start: 2024-07-09 | End: 2024-07-14

## 2024-07-09 RX ORDER — NITROFURANTOIN 25; 75 MG/1; MG/1
100 CAPSULE ORAL 2 TIMES DAILY
COMMUNITY
Start: 2024-07-03

## 2024-07-09 RX ORDER — ONDANSETRON 4 MG/1
4 TABLET, ORALLY DISINTEGRATING ORAL EVERY 6 HOURS PRN
Qty: 20 TABLET | Refills: 0 | Status: SHIPPED | OUTPATIENT
Start: 2024-07-09 | End: 2024-07-14

## 2024-07-09 RX ADMIN — SODIUM CHLORIDE 1000 ML: 9 INJECTION, SOLUTION INTRAVENOUS at 03:07

## 2024-07-09 RX ADMIN — KETOROLAC TROMETHAMINE 30 MG: 30 INJECTION, SOLUTION INTRAMUSCULAR at 03:07

## 2024-07-09 NOTE — ED PROVIDER NOTES
History  Chief Complaint   Patient presents with    Flank Pain     Pt to the ER w/ complaints of R sided flank pain radiating into her RLQ and bladder. Pt states that she feels like she is unable to empty her bladder. Placed on Macrobid on  by OB. Pt also reports vomiting.      28-year-old female presents for evaluation of right flank pain radiating into the lower abdomen.  Symptoms ongoing and intermittent over the past 8 days.  Patient was seen by her obstetrician/gynecologist who did not ultrasound which was negative.  Patient was placed on Macrobid by her gynecologist for presumed infection.  All other systems reviewed and unremarkable        Past Medical History:   Diagnosis Date     section wound complication        Past Surgical History:   Procedure Laterality Date    EXAMINATION UNDER ANESTHESIA N/A 10/7/2022    Procedure: Exam under anesthesia with drainage of perirectal abscess;  Surgeon: Farideh Davenport MD;  Location: [facility] OR;  Service: General;  Laterality: N/A;    HYSTERECTOMY      INCISION AND DRAINAGE OF PERIRECTAL REGION Left 2021    Procedure: INCISION AND DRAINAGE, PERIRECTAL REGION;  Surgeon: Farideh Davenport MD;  Location: [facility] OR;  Service: General;  Laterality: Left;    LAPAROSCOPIC APPENDECTOMY N/A 2022    Procedure: APPENDECTOMY, LAPAROSCOPIC;  Surgeon: Farideh Davenport MD;  Location: [facility] OR;  Service: General;  Laterality: N/A;    REPAIR, HERNIA, UMBILICAL  2022    Procedure: REPAIR, HERNIA, UMBILICAL;  Surgeon: Farideh Davenport MD;  Location: [facility] OR;  Service: General;;       Family History   Problem Relation Name Age of Onset    Diabetes Father      Hypertension Father         Social History     Tobacco Use    Smoking status: Never   Substance Use Topics    Alcohol use: Never    Drug use: Never       ROS  Review of Systems   Gastrointestinal:  Positive for abdominal pain.   Genitourinary:  Positive for flank pain.       Physical Exam  BP (!)  "141/72   Pulse 95   Temp 97.9 °F (36.6 °C) (Oral)   Resp 20   Ht 5' 3" (1.6 m)   Wt 106.6 kg (235 lb)   SpO2 96%   Breastfeeding No   BMI 41.63 kg/m²   Physical Exam    Constitutional: She appears well-developed and well-nourished. She is cooperative.   HENT:   Head: Normocephalic and atraumatic.   Eyes: Conjunctivae, EOM and lids are normal. Pupils are equal, round, and reactive to light.   Neck: Phonation normal.   Normal range of motion.  Cardiovascular:  Normal rate, regular rhythm and intact distal pulses.           Pulmonary/Chest: Breath sounds normal. No respiratory distress.   Abdominal: Abdomen is soft. She exhibits no distension.   There is right CVA tenderness.  Musculoskeletal:      Cervical back: Normal range of motion.     Neurological: She is alert and oriented to person, place, and time.   Skin: Skin is warm and dry.   Psychiatric: She has a normal mood and affect. Her speech is normal and behavior is normal.               Labs Reviewed   URINALYSIS, REFLEX TO URINE CULTURE - Abnormal; Notable for the following components:       Result Value    Leukocytes, UA 1+ (*)     All other components within normal limits    Narrative:     Preferred Collection Type->Urine, Clean Catch  Specimen Source->Urine   COMPREHENSIVE METABOLIC PANEL - Abnormal; Notable for the following components:    CO2 30 (*)     Alkaline Phosphatase 54 (*)     All other components within normal limits   URINALYSIS MICROSCOPIC - Abnormal; Notable for the following components:    WBC, UA 24 (*)     Hyaline Casts, UA 0.7 (*)     All other components within normal limits    Narrative:     Preferred Collection Type->Urine, Clean Catch  Specimen Source->Urine   CULTURE, URINE   PREGNANCY TEST, URINE RAPID    Narrative:     Specimen Source->Urine   CBC W/ AUTO DIFFERENTIAL   LIPASE           Imaging Results              CT Renal Stone Study ABD Pelvis WO (In process)                                 Procedures             Medical " Decision Making    Differentials include nephrolithiasis, appendicitis, cystitis, cholecystitis or alternate pathology.  Will obtain labs and imaging.  See ED course for updates    Amount and/or Complexity of Data Reviewed  Labs: ordered. Decision-making details documented in ED Course.  Radiology: ordered.    Risk  Prescription drug management.               ED Course as of 07/09/24 0531   Tue Jul 09, 2024   0326 Leukocyte Esterase, UA(!): 1+ [NA]   0326 NITRITE UA: Negative [NA]   0326 WBC, UA(!): 24 [NA]   0326 Bacteria, UA: Negative [NA]   0326 WBC: 5.97 [NA]   0326 Hemoglobin: 12.6 [NA]   0326 Hematocrit: 37.9 [NA]   0326 Platelet Count: 269 [NA]   0512 CT notable for a 5 mm right UVJ stone with mild right hydronephrosis [NA]   0530 Will discharge with prescriptions for symptomatic relief in the outpatient setting.  Patient advised to follow up with Urology in the outpatient setting.  Return precautions were given [NA]      ED Course User Index  [NA] Wilfrid Kelly MD       DISCHARGE NOTE:       Chuyita Eli's  results have been reviewed with her.  She has been counseled regarding her diagnosis, treatment, and plan.  She verbally conveys understanding and agreement of the signs, symptoms, diagnosis, treatment and prognosis and additionally agrees to follow up as discussed.  She also agrees with the care-plan and conveys that all of her questions have been answered.  I have also provided discharge instructions for her that include: educational information regarding their diagnosis and treatment, and list of reasons why they would want to return to the ED prior to their follow-up appointment, should her condition change. She has been provided with education for proper emergency department utilization.      CLINICAL IMPRESSION:         1. Nephrolithiasis              PLAN:   1. Discharge  2.   New Prescriptions    KETOROLAC (TORADOL) 10 MG TABLET    Take 1 tablet (10 mg total) by mouth every 6 (six)  hours. for 5 days    ONDANSETRON (ZOFRAN-ODT) 4 MG TBDL    Take 1 tablet (4 mg total) by mouth every 6 (six) hours as needed.    OXYCODONE-ACETAMINOPHEN (PERCOCET) 5-325 MG PER TABLET    Take 1 tablet by mouth every 8 (eight) hours as needed (Pain controlled by toradol).    TAMSULOSIN (FLOMAX) 0.4 MG CAP    Take 1 capsule (0.4 mg total) by mouth once daily.     3. Devante Littlejohn MD  [facility] [address] [address]  [phone]    Schedule an appointment as soon as possible for a visit in 1 day            Wilfrid Kelly MD  07/09/24 [id number]

## 2024-07-10 LAB — BACTERIA UR CULT: NO GROWTH

## (undated) DEVICE — NDL SAFETY 22G X 1.5 ECLIPSE

## (undated) DEVICE — GLOVE SURG ULTRA TOUCH 7

## (undated) DEVICE — GAUZE SPONGE XRAY 4X4

## (undated) DEVICE — SUT MONOCRYL 4-0 PS-2

## (undated) DEVICE — APPLICATOR STRL COT 2INNR 6IN

## (undated) DEVICE — TIP YANKAUERS BULB NO VENT

## (undated) DEVICE — DRAPE UNDERBUTTOCKS PCH STRL

## (undated) DEVICE — DRAPE LAPSCP CHOLE 122X102X78

## (undated) DEVICE — LINER GLOVE POWDERFREE SZ 6.5

## (undated) DEVICE — COVER OVERHEAD SURG LT BLUE

## (undated) DEVICE — SOL NACL IRR 1000ML BTL

## (undated) DEVICE — DRESSING PRIMAPORE 4X3 1/8IN

## (undated) DEVICE — GOWN SURGICAL BRTHBL XL XLNG

## (undated) DEVICE — SUT CHROMIC 2-0 SH 27IN BRN

## (undated) DEVICE — TRAY CATH FOL SIL URIMTR 16FR

## (undated) DEVICE — SUT SILK 0 SUTUPAK SA86H

## (undated) DEVICE — RELOAD ECHELON ENDOPATH 45MM

## (undated) DEVICE — ELECTRODE REM PLYHSV RETURN 9

## (undated) DEVICE — LOOP VESSEL BLUE MAXI

## (undated) DEVICE — LINER SUCTION CANNISTER REGUGA

## (undated) DEVICE — SCALPEL SZ 11 RETRACTABLE

## (undated) DEVICE — BAG SPEC RETRV ENDO 4X6IN DISP

## (undated) DEVICE — BLANKET UPPER BODY 78.7X29.9IN

## (undated) DEVICE — LINER GLOVE POWDERFREE SZ 7

## (undated) DEVICE — LINER MEDI-VAC PPV FLTR 1500CC

## (undated) DEVICE — STAPLER ECHELON FLEX GST 45MM

## (undated) DEVICE — BLADE SURG #15 CARBON STEEL

## (undated) DEVICE — UNDERPAD DISPOSABLE 30X30IN

## (undated) DEVICE — SLEEVE SCD EXPRESS KNEE MEDIUM

## (undated) DEVICE — STRAP KNEE & BODY DISP 4X34IN

## (undated) DEVICE — SET PNEUMOCLEAR HEAT HUM SE HF

## (undated) DEVICE — SUT 0 VICRYL / UR6 (J603)

## (undated) DEVICE — GLOVE SURGICAL LATEX SZ 6.5

## (undated) DEVICE — SYR IRRIGATION BULB STER 60ML

## (undated) DEVICE — TROCAR ENDOPATH XCEL 5X100MM

## (undated) DEVICE — SOL 9P NACL IRR PIC IL

## (undated) DEVICE — CLEANER CAUT TIP STRL 2X2IN

## (undated) DEVICE — DRESSING PRIMAPORE IV 2X3IN

## (undated) DEVICE — SPONGE POST-OP GAUZE 4X4IN

## (undated) DEVICE — TUBE SUC UNIVERSAL .25XIN 6FT

## (undated) DEVICE — UNDERGLOVES BIOGEL PI SZ 7 LF

## (undated) DEVICE — SYR 10CC LUER LOCK

## (undated) DEVICE — Device

## (undated) DEVICE — SCISSOR 5MMX35CM DIRECT DRIVE

## (undated) DEVICE — SPONGE LAP 18X18 PREWASHED

## (undated) DEVICE — SPONGE SURGIFOAM GELATIN SZ50

## (undated) DEVICE — SKIN MARKER STER DUAL TIP

## (undated) DEVICE — PAD CURITY MATERNITY PERI

## (undated) DEVICE — CAUTERY PUSHBUTTON PENCIL

## (undated) DEVICE — ADHESIVE MASTISOL VIAL 48/BX

## (undated) DEVICE — PACK SURG LITHOTOMY 3 SIRUS

## (undated) DEVICE — JELLY LUBRICATING STERILE 2OZ

## (undated) DEVICE — APPLICATOR CHLORAPREP ORN 26ML

## (undated) DEVICE — COUNTER NDL DBL MAG 100

## (undated) DEVICE — GOWN SURGICAL BRTHBL XL

## (undated) DEVICE — SYS SEE SHARP SCOPE ANTIFOG

## (undated) DEVICE — TRAY MAJOR LAPAROTOMY SURG I

## (undated) DEVICE — TOWEL OR XRAY WHITE 17X26IN

## (undated) DEVICE — SUT CHROMIC 2-0 CT1 36IN BR

## (undated) DEVICE — CLOSURE SKIN STERI STRIP 1/2X4

## (undated) DEVICE — GLOVE BIOGEL ECLIPSE SZ 7

## (undated) DEVICE — PAD ABDOMINAL STERILE 5X9IN

## (undated) DEVICE — SEE MEDLINE ITEM 154981